# Patient Record
Sex: FEMALE | Race: WHITE | NOT HISPANIC OR LATINO | Employment: UNEMPLOYED | URBAN - NONMETROPOLITAN AREA
[De-identification: names, ages, dates, MRNs, and addresses within clinical notes are randomized per-mention and may not be internally consistent; named-entity substitution may affect disease eponyms.]

---

## 2024-04-16 ENCOUNTER — HOSPITAL ENCOUNTER (OUTPATIENT)
Dept: RADIOLOGY | Facility: HOSPITAL | Age: 39
Discharge: HOME | End: 2024-04-16
Attending: NURSE PRACTITIONER
Payer: MEDICAID

## 2024-04-16 DIAGNOSIS — R10.32 ABDOMINAL PAIN, LEFT LOWER QUADRANT: ICD-10-CM

## 2024-04-16 PROCEDURE — 74018 RADEX ABDOMEN 1 VIEW: CPT | Mod: TC

## 2024-04-29 ENCOUNTER — HOSPITAL ENCOUNTER (EMERGENCY)
Facility: HOSPITAL | Age: 39
Discharge: ANOTHER HOSPITAL | End: 2024-04-29
Attending: FAMILY MEDICINE
Payer: MEDICAID

## 2024-04-29 ENCOUNTER — HOSPITAL ENCOUNTER (INPATIENT)
Facility: HOSPITAL | Age: 39
LOS: 2 days | Discharge: HOME | DRG: 661 | End: 2024-05-01
Attending: INTERNAL MEDICINE | Admitting: INTERNAL MEDICINE
Payer: MEDICAID

## 2024-04-29 VITALS
TEMPERATURE: 98 F | HEIGHT: 65 IN | WEIGHT: 145 LBS | OXYGEN SATURATION: 99 % | HEART RATE: 79 BPM | RESPIRATION RATE: 17 BRPM | DIASTOLIC BLOOD PRESSURE: 99 MMHG | SYSTOLIC BLOOD PRESSURE: 171 MMHG | BODY MASS INDEX: 24.16 KG/M2

## 2024-04-29 DIAGNOSIS — R07.9 CHEST PAIN: ICD-10-CM

## 2024-04-29 DIAGNOSIS — N20.1 LEFT URETERAL STONE: ICD-10-CM

## 2024-04-29 DIAGNOSIS — N13.30 HYDRONEPHROSIS: ICD-10-CM

## 2024-04-29 DIAGNOSIS — N20.0 KIDNEY STONE: ICD-10-CM

## 2024-04-29 DIAGNOSIS — N13.30 HYDRONEPHROSIS, UNSPECIFIED HYDRONEPHROSIS TYPE: Primary | ICD-10-CM

## 2024-04-29 LAB
ALBUMIN SERPL-MCNC: 4.1 G/DL (ref 3.4–5)
ALBUMIN/GLOB SERPL: 1.3 RATIO
ALP SERPL-CCNC: 76 UNIT/L (ref 50–144)
ALT SERPL-CCNC: 23 UNIT/L (ref 1–45)
ANION GAP SERPL CALC-SCNC: 10 MEQ/L (ref 2–13)
APPEARANCE UR: ABNORMAL
AST SERPL-CCNC: 31 UNIT/L (ref 14–36)
B-HCG SERPL QL: NEGATIVE
BACTERIA #/AREA URNS AUTO: ABNORMAL /HPF
BASOPHILS # BLD AUTO: 0.06 X10(3)/MCL (ref 0.01–0.08)
BASOPHILS NFR BLD AUTO: 0.8 % (ref 0.1–1.2)
BILIRUB SERPL-MCNC: 0.3 MG/DL (ref 0–1)
BILIRUB UR QL STRIP.AUTO: NEGATIVE
BUN SERPL-MCNC: 6 MG/DL (ref 7–20)
CALCIUM SERPL-MCNC: 8.5 MG/DL (ref 8.4–10.2)
CHLORIDE SERPL-SCNC: 107 MMOL/L (ref 98–110)
CO2 SERPL-SCNC: 24 MMOL/L (ref 21–32)
COLOR UR AUTO: ABNORMAL
CREAT SERPL-MCNC: 0.51 MG/DL (ref 0.66–1.25)
CREAT/UREA NIT SERPL: 12 (ref 12–20)
EOSINOPHIL # BLD AUTO: 0.43 X10(3)/MCL (ref 0.04–0.36)
EOSINOPHIL NFR BLD AUTO: 5.4 % (ref 0.7–7)
ERYTHROCYTE [DISTWIDTH] IN BLOOD BY AUTOMATED COUNT: 14.2 % (ref 11–14.5)
GFR SERPLBLD CREATININE-BSD FMLA CKD-EPI: >90 MLS/MIN/1.73/M2
GLOBULIN SER-MCNC: 3.1 GM/DL (ref 2–3.9)
GLUCOSE SERPL-MCNC: 107 MG/DL (ref 70–115)
GLUCOSE UR QL STRIP.AUTO: NEGATIVE
HCT VFR BLD AUTO: 33.9 % (ref 36–48)
HGB BLD-MCNC: 11 G/DL (ref 11.8–16)
IMM GRANULOCYTES # BLD AUTO: 0.01 X10(3)/MCL (ref 0–0.03)
IMM GRANULOCYTES NFR BLD AUTO: 0.1 % (ref 0–0.5)
KETONES UR QL STRIP.AUTO: NEGATIVE
LEUKOCYTE ESTERASE UR QL STRIP.AUTO: NEGATIVE
LYMPHOCYTES # BLD AUTO: 3.87 X10(3)/MCL (ref 1.16–3.74)
LYMPHOCYTES NFR BLD AUTO: 49 % (ref 20–55)
MAGNESIUM SERPL-MCNC: 1.9 MG/DL (ref 1.8–2.4)
MCH RBC QN AUTO: 29.1 PG (ref 27–34)
MCHC RBC AUTO-ENTMCNC: 32.4 G/DL (ref 31–37)
MCV RBC AUTO: 89.7 FL (ref 79–99)
MONOCYTES # BLD AUTO: 0.48 X10(3)/MCL (ref 0.24–0.36)
MONOCYTES NFR BLD AUTO: 6.1 % (ref 4.7–12.5)
NEUTROPHILS # BLD AUTO: 3.04 X10(3)/MCL (ref 1.56–6.13)
NEUTROPHILS NFR BLD AUTO: 38.6 % (ref 37–73)
NITRITE UR QL STRIP.AUTO: NEGATIVE
NRBC BLD AUTO-RTO: 0 %
PH UR STRIP.AUTO: 7 [PH]
PLATELET # BLD AUTO: 335 X10(3)/MCL (ref 140–371)
PMV BLD AUTO: 10.7 FL (ref 9.4–12.4)
POTASSIUM SERPL-SCNC: 3.3 MMOL/L (ref 3.5–5.1)
PROT SERPL-MCNC: 7.2 GM/DL (ref 6.3–8.2)
PROT UR QL STRIP.AUTO: NEGATIVE
RBC # BLD AUTO: 3.78 X10(6)/MCL (ref 4–5.1)
RBC #/AREA URNS AUTO: >100 /HPF
RBC UR QL AUTO: ABNORMAL
SODIUM SERPL-SCNC: 141 MMOL/L (ref 135–145)
SP GR UR STRIP.AUTO: 1.01 (ref 1–1.03)
SQUAMOUS #/AREA URNS AUTO: ABNORMAL /HPF
UROBILINOGEN UR STRIP-ACNC: 0.2
WBC # SPEC AUTO: 7.89 X10(3)/MCL (ref 4–11.5)

## 2024-04-29 PROCEDURE — 96374 THER/PROPH/DIAG INJ IV PUSH: CPT

## 2024-04-29 PROCEDURE — 11000001 HC ACUTE MED/SURG PRIVATE ROOM

## 2024-04-29 PROCEDURE — 81025 URINE PREGNANCY TEST: CPT | Performed by: FAMILY MEDICINE

## 2024-04-29 PROCEDURE — 96372 THER/PROPH/DIAG INJ SC/IM: CPT | Mod: 59 | Performed by: FAMILY MEDICINE

## 2024-04-29 PROCEDURE — 96361 HYDRATE IV INFUSION ADD-ON: CPT

## 2024-04-29 PROCEDURE — 96375 TX/PRO/DX INJ NEW DRUG ADDON: CPT

## 2024-04-29 PROCEDURE — 83735 ASSAY OF MAGNESIUM: CPT | Performed by: FAMILY MEDICINE

## 2024-04-29 PROCEDURE — 25000003 PHARM REV CODE 250: Performed by: FAMILY MEDICINE

## 2024-04-29 PROCEDURE — 63600175 PHARM REV CODE 636 W HCPCS: Performed by: FAMILY MEDICINE

## 2024-04-29 PROCEDURE — 81003 URINALYSIS AUTO W/O SCOPE: CPT | Performed by: FAMILY MEDICINE

## 2024-04-29 PROCEDURE — 99285 EMERGENCY DEPT VISIT HI MDM: CPT | Mod: 25

## 2024-04-29 PROCEDURE — 80053 COMPREHEN METABOLIC PANEL: CPT | Performed by: FAMILY MEDICINE

## 2024-04-29 PROCEDURE — 85025 COMPLETE CBC W/AUTO DIFF WBC: CPT | Performed by: FAMILY MEDICINE

## 2024-04-29 RX ORDER — MORPHINE SULFATE 4 MG/ML
4 INJECTION, SOLUTION INTRAMUSCULAR; INTRAVENOUS
Status: COMPLETED | OUTPATIENT
Start: 2024-04-29 | End: 2024-04-29

## 2024-04-29 RX ORDER — TAMSULOSIN HYDROCHLORIDE 0.4 MG/1
0.4 CAPSULE ORAL
Status: COMPLETED | OUTPATIENT
Start: 2024-04-29 | End: 2024-04-29

## 2024-04-29 RX ORDER — HYDROMORPHONE HYDROCHLORIDE 1 MG/ML
1 INJECTION, SOLUTION INTRAMUSCULAR; INTRAVENOUS; SUBCUTANEOUS
Status: COMPLETED | OUTPATIENT
Start: 2024-04-29 | End: 2024-04-29

## 2024-04-29 RX ORDER — CLONIDINE HYDROCHLORIDE 0.1 MG/1
0.1 TABLET ORAL
Status: COMPLETED | OUTPATIENT
Start: 2024-04-29 | End: 2024-04-29

## 2024-04-29 RX ORDER — POTASSIUM CHLORIDE 20 MEQ/1
40 TABLET, EXTENDED RELEASE ORAL
Status: COMPLETED | OUTPATIENT
Start: 2024-04-29 | End: 2024-04-29

## 2024-04-29 RX ORDER — SUMATRIPTAN 6 MG/.5ML
6 INJECTION, SOLUTION SUBCUTANEOUS ONCE
Status: COMPLETED | OUTPATIENT
Start: 2024-04-29 | End: 2024-04-29

## 2024-04-29 RX ORDER — HYDRALAZINE HYDROCHLORIDE 20 MG/ML
10 INJECTION INTRAMUSCULAR; INTRAVENOUS
Status: COMPLETED | OUTPATIENT
Start: 2024-04-29 | End: 2024-04-29

## 2024-04-29 RX ADMIN — TAMSULOSIN HYDROCHLORIDE 0.4 MG: 0.4 CAPSULE ORAL at 06:04

## 2024-04-29 RX ADMIN — MORPHINE SULFATE 4 MG: 4 INJECTION, SOLUTION INTRAMUSCULAR; INTRAVENOUS at 05:04

## 2024-04-29 RX ADMIN — SUMATRIPTAN 6 MG: 6 INJECTION, SOLUTION SUBCUTANEOUS at 06:04

## 2024-04-29 RX ADMIN — CLONIDINE HYDROCHLORIDE 0.1 MG: 0.1 TABLET ORAL at 06:04

## 2024-04-29 RX ADMIN — POTASSIUM CHLORIDE 40 MEQ: 1500 TABLET, EXTENDED RELEASE ORAL at 06:04

## 2024-04-29 RX ADMIN — SODIUM CHLORIDE 1000 ML: 9 INJECTION, SOLUTION INTRAVENOUS at 05:04

## 2024-04-29 RX ADMIN — HYDRALAZINE HYDROCHLORIDE 10 MG: 20 INJECTION INTRAMUSCULAR; INTRAVENOUS at 07:04

## 2024-04-29 RX ADMIN — HYDROMORPHONE HYDROCHLORIDE 1 MG: 1 INJECTION, SOLUTION INTRAMUSCULAR; INTRAVENOUS; SUBCUTANEOUS at 07:04

## 2024-04-29 NOTE — ED PROVIDER NOTES
Encounter Date: 4/29/2024       History     Chief Complaint   Patient presents with    Back Pain    Headache    Nausea     Pt reports she started having back left side pain and into her flank. Reports she has had kidney stones and kidney infections before and this feels the same. Also reports she has been having blood in her urine and has a migraine.      Patient presents for evaluation of headache and left-sided flank pain.  Patient notes having symptoms since this morning.  Pain is aching moderate nonradiating pain that is present throughout the day.  Patient notes pain is worse with movement.  Patient has a history of migraines in the past and headache pain is similar to previous episodes.  Pain is throbbing and nonradiating.  Patient denies fevers chills nausea vomiting.  Patient does note having some hematuria with her flank pain.  Patient has a history of previous kidney stones with similar episodes.  Flank pain is sharp constant since onset.  Patient denies having aggravating or relieving    The history is provided by the patient.     Review of patient's allergies indicates:   Allergen Reactions    Sulfamethoxazole-trimethoprim Other (See Comments)     Past Medical History:   Diagnosis Date    Anxiety     Depression     Hematuria     HTN (hypertension)     Kidney stone     Migraine headache     UTI (urinary tract infection)      Past Surgical History:   Procedure Laterality Date    LITHOTRIPSY  2021    SINUS SURGERY      SINUS SURGERY      TONSILLECTOMY, ADENOIDECTOMY      TYMPANOSTOMY TUBE PLACEMENT       Family History   Problem Relation Name Age of Onset    Heart disease Mother      Hypertension Mother       Social History     Tobacco Use    Smoking status: Every Day   Substance Use Topics    Alcohol use: Not Currently     Review of Systems   Constitutional: Negative.    HENT: Negative.     Eyes: Negative.    Respiratory: Negative.     Cardiovascular: Negative.    Gastrointestinal: Negative.    Endocrine:  Negative.    Genitourinary:  Positive for flank pain, frequency, hematuria and urgency.   Skin: Negative.    Allergic/Immunologic: Negative.    Neurological:  Positive for headaches.   Hematological: Negative.    Psychiatric/Behavioral: Negative.     All other systems reviewed and are negative.      Physical Exam     Initial Vitals [04/29/24 1650]   BP Pulse Resp Temp SpO2   (!) 158/104 78 20 98.1 °F (36.7 °C) 99 %      MAP       --         Physical Exam    Vitals reviewed.  Constitutional: She appears well-developed and well-nourished. She is not diaphoretic. No distress.   HENT:   Head: Normocephalic and atraumatic.   Mouth/Throat: No oropharyngeal exudate.   Eyes: Conjunctivae and EOM are normal. Pupils are equal, round, and reactive to light. Right eye exhibits no discharge. Left eye exhibits no discharge.   Neck: Neck supple. No thyromegaly present. No tracheal deviation present.   Normal range of motion.  Cardiovascular:  Normal rate and regular rhythm.     Exam reveals no friction rub.       No murmur heard.  Pulmonary/Chest: Breath sounds normal. No stridor. No respiratory distress. She has no wheezes. She has no rales.   Abdominal: Abdomen is soft. Bowel sounds are normal. She exhibits no distension. There is no abdominal tenderness. There is no rebound.   Musculoskeletal:         General: No tenderness or edema. Normal range of motion.      Cervical back: Normal range of motion and neck supple.     Neurological: She is alert and oriented to person, place, and time. She has normal reflexes. She displays normal reflexes. No cranial nerve deficit.   Skin: Skin is warm.   Psychiatric: She has a normal mood and affect.         ED Course   Procedures  Labs Reviewed   COMPREHENSIVE METABOLIC PANEL - Abnormal; Notable for the following components:       Result Value    Potassium Level 3.3 (*)     Blood Urea Nitrogen 6.0 (*)     Creatinine 0.51 (*)     All other components within normal limits   URINALYSIS -  Abnormal; Notable for the following components:    Color, UA Brown (*)     Appearance, UA SL CLOUDY (*)     Blood, UA Large (*)     All other components within normal limits    Narrative:      URINE STABILITY IS 2 HOURS AT ROOM TEMP OR    SIX HOURS REFRIGERATED. PERFORMING TESTING ON    SPECIMENS GREATER THAN THIS AGE MAY AFFECT THE    FOLLOWING TESTS:    PH          SPECIFIC GRAVITY           BLOOD    CLARITY     BILIRUBIN               UROBILINOGEN   CBC WITH DIFFERENTIAL - Abnormal; Notable for the following components:    RBC 3.78 (*)     Hgb 11.0 (*)     Hct 33.9 (*)     Lymph # 3.87 (*)     Mono # 0.48 (*)     Eos # 0.43 (*)     All other components within normal limits   URINALYSIS, MICROSCOPIC - Abnormal; Notable for the following components:    RBC, UA >100 (*)     All other components within normal limits   MAGNESIUM - Normal   HCG QUALITATIVE URINE - Normal   CBC W/ AUTO DIFFERENTIAL    Narrative:     The following orders were created for panel order CBC auto differential.  Procedure                               Abnormality         Status                     ---------                               -----------         ------                     CBC with Differential[[id number]]       Abnormal            Final result                 Please view results for these tests on the individual orders.          Imaging Results              CT Abdomen Pelvis  Without Contrast (Final result)  Result time 04/29/24 18:32:31      Final result by Johann Nowak MD (04/29/24 18:32:31)                   Impression:        1. A 6 mm radiopaque obstructing calculus involving the left ureter at the level of the iliac crest with mild upstream left-sided hydronephrosis and hydroureter.    2.  Both kidneys demonstrate multiple small nonobstructing calculi within the calices measuring less than 5 mm in size.    3.  Formed stool is present in the region of the cecum and the ascending colon which can be seen with mild  constipation.    n/a    CATEGORY: n/a    The following dose reduction techniques are used for all CT at [facility]:    1.   Automated exposure control.    2.   Adjustment of the mA and/or kV according to patient size.    3.   Use of iterative reconstruction technique.      Electronically signed by: Johann Nowak  Date:    04/29/2024  Time:    18:32               Narrative:    EXAMINATION:  CT ABDOMEN PELVIS WITHOUT CONTRAST    CLINICAL HISTORY:  Flank pain;    TECHNIQUE:  Low dose axial images, sagittal and coronal reformations were obtained from the lung bases to the pubic symphysis.  Oral contrast was not administered.    COMPARISON:  04/30/2021    FINDINGS:  Liver:  No clinically significant abnormalities noted.    Gallbladder/Biliary System:  No clinically significant abnormalities noted.    Spleen:  No clinically significant abnormalities noted.    Adrenal glands:  No clinically significant abnormalities noted.    Pancreas:  No clinically significant abnormalities noted.    Kidneys/Urinary Tract: There is mild low-grade left-sided hydronephrosis and hydroureter associated with a 6 mm obstructing calculus at the level of the iliac crest.  Phlebolith like calcifications are present in the pelvic region on both sides.  The right kidney demonstrates multiple (nearly 10) radiopaque nonobstructing calculi, the largest measuring 5 mm.  The left kidney demonstrates multiple (approximately 5) nonobstructing calculi, the largest measuring approximately 5 mm.    Urinary bladder:  No clinically significant abnormalities noted.    Prostate gland/uterus and ovaries:  No clinically significant abnormalities noted.    GI tract:  Formed stool is present in the region of the cecum and the ascending colon which can be seen with mild constipation.    Vascular structures:  No clinically significant abnormalities noted.    Musculoskeletal structures:  No clinically significant abnormalities  noted.    Miscellaneous:  No clinically significant abnormalities noted.                                       Medications   sodium chloride 0.9% bolus 1,000 mL 1,000 mL (0 mLs Intravenous Stopped 4/29/24 1824)   morphine injection 4 mg (4 mg Intravenous Given 4/29/24 1724)   potassium chloride SA CR tablet 40 mEq (40 mEq Oral Given 4/29/24 1806)   cloNIDine tablet 0.1 mg (0.1 mg Oral Given 4/29/24 1816)   tamsulosin 24 hr capsule 0.4 mg (0.4 mg Oral Given 4/29/24 1855)   SUMAtriptan succinate injection 6 mg (6 mg Subcutaneous Given 4/29/24 1855)   hydrALAZINE injection 10 mg (10 mg Intravenous Given 4/29/24 1906)     Medical Decision Making  Amount and/or Complexity of Data Reviewed  Labs: ordered.  Radiology: ordered.    Risk  Prescription drug management.                                  Patient accepted by Dr. Mims at [facility].    Clinical Impression:  Final diagnoses:  [N13.30] Hydronephrosis, unspecified hydronephrosis type (Primary)  [N20.0] Kidney stone          ED Disposition Condition    Transfer to Another Facility Carlos Bello MD  04/29/24 1931

## 2024-04-30 ENCOUNTER — ANESTHESIA (OUTPATIENT)
Dept: SURGERY | Facility: HOSPITAL | Age: 39
DRG: 661 | End: 2024-04-30
Payer: MEDICAID

## 2024-04-30 ENCOUNTER — ANESTHESIA EVENT (OUTPATIENT)
Dept: SURGERY | Facility: HOSPITAL | Age: 39
DRG: 661 | End: 2024-04-30
Payer: MEDICAID

## 2024-04-30 LAB
ALBUMIN SERPL-MCNC: 3.6 G/DL (ref 3.5–5)
ALBUMIN/GLOB SERPL: 1.4 RATIO (ref 1.1–2)
ALP SERPL-CCNC: 66 UNIT/L (ref 40–150)
ALT SERPL-CCNC: 10 UNIT/L (ref 0–55)
AMPHET UR QL SCN: NEGATIVE
AST SERPL-CCNC: 15 UNIT/L (ref 5–34)
BARBITURATE SCN PRESENT UR: NEGATIVE
BASOPHILS # BLD AUTO: 0.05 X10(3)/MCL
BASOPHILS NFR BLD AUTO: 0.6 %
BENZODIAZ UR QL SCN: POSITIVE
BILIRUB SERPL-MCNC: 0.3 MG/DL
BUN SERPL-MCNC: 5.7 MG/DL (ref 7–18.7)
CALCIUM SERPL-MCNC: 8.7 MG/DL (ref 8.4–10.2)
CANNABINOIDS UR QL SCN: NEGATIVE
CHLORIDE SERPL-SCNC: 109 MMOL/L (ref 98–107)
CO2 SERPL-SCNC: 22 MMOL/L (ref 22–29)
COCAINE UR QL SCN: NEGATIVE
CREAT SERPL-MCNC: 0.66 MG/DL (ref 0.55–1.02)
EOSINOPHIL # BLD AUTO: 0.24 X10(3)/MCL (ref 0–0.9)
EOSINOPHIL NFR BLD AUTO: 3.1 %
ERYTHROCYTE [DISTWIDTH] IN BLOOD BY AUTOMATED COUNT: 14.6 % (ref 11.5–17)
FENTANYL UR QL SCN: POSITIVE
GFR SERPLBLD CREATININE-BSD FMLA CKD-EPI: >60 MLS/MIN/1.73/M2
GLOBULIN SER-MCNC: 2.6 GM/DL (ref 2.4–3.5)
GLUCOSE SERPL-MCNC: 112 MG/DL (ref 74–100)
HCT VFR BLD AUTO: 32.4 % (ref 37–47)
HGB BLD-MCNC: 10.4 G/DL (ref 12–16)
IMM GRANULOCYTES # BLD AUTO: 0.02 X10(3)/MCL (ref 0–0.04)
IMM GRANULOCYTES NFR BLD AUTO: 0.3 %
LYMPHOCYTES # BLD AUTO: 2.12 X10(3)/MCL (ref 0.6–4.6)
LYMPHOCYTES NFR BLD AUTO: 27.1 %
MAGNESIUM SERPL-MCNC: 1.9 MG/DL (ref 1.6–2.6)
MCH RBC QN AUTO: 29.3 PG (ref 27–31)
MCHC RBC AUTO-ENTMCNC: 32.1 G/DL (ref 33–36)
MCV RBC AUTO: 91.3 FL (ref 80–94)
MDMA UR QL SCN: NEGATIVE
MONOCYTES # BLD AUTO: 0.44 X10(3)/MCL (ref 0.1–1.3)
MONOCYTES NFR BLD AUTO: 5.6 %
NEUTROPHILS # BLD AUTO: 4.94 X10(3)/MCL (ref 2.1–9.2)
NEUTROPHILS NFR BLD AUTO: 63.3 %
NRBC BLD AUTO-RTO: 0 %
OPIATES UR QL SCN: POSITIVE
PCP UR QL: NEGATIVE
PH UR: 6.5 [PH] (ref 3–11)
PLATELET # BLD AUTO: 280 X10(3)/MCL (ref 130–400)
PMV BLD AUTO: 11 FL (ref 7.4–10.4)
POTASSIUM SERPL-SCNC: 3.7 MMOL/L (ref 3.5–5.1)
PROT SERPL-MCNC: 6.2 GM/DL (ref 6.4–8.3)
RBC # BLD AUTO: 3.55 X10(6)/MCL (ref 4.2–5.4)
SODIUM SERPL-SCNC: 139 MMOL/L (ref 136–145)
SPECIFIC GRAVITY, URINE AUTO (.000) (OHS): 1.02 (ref 1–1.03)
WBC # SPEC AUTO: 7.81 X10(3)/MCL (ref 4.5–11.5)

## 2024-04-30 PROCEDURE — 37000009 HC ANESTHESIA EA ADD 15 MINS: Performed by: SPECIALIST

## 2024-04-30 PROCEDURE — BT141ZZ FLUOROSCOPY OF KIDNEYS, URETERS AND BLADDER USING LOW OSMOLAR CONTRAST: ICD-10-PCS | Performed by: SPECIALIST

## 2024-04-30 PROCEDURE — 11000001 HC ACUTE MED/SURG PRIVATE ROOM

## 2024-04-30 PROCEDURE — D9220A PRA ANESTHESIA: Mod: ANES,,, | Performed by: ANESTHESIOLOGY

## 2024-04-30 PROCEDURE — 63600175 PHARM REV CODE 636 W HCPCS

## 2024-04-30 PROCEDURE — C1894 INTRO/SHEATH, NON-LASER: HCPCS | Performed by: SPECIALIST

## 2024-04-30 PROCEDURE — 36415 COLL VENOUS BLD VENIPUNCTURE: CPT | Performed by: NURSE PRACTITIONER

## 2024-04-30 PROCEDURE — 25000003 PHARM REV CODE 250

## 2024-04-30 PROCEDURE — D9220A PRA ANESTHESIA: Mod: CRNA,,,

## 2024-04-30 PROCEDURE — 80053 COMPREHEN METABOLIC PANEL: CPT | Performed by: NURSE PRACTITIONER

## 2024-04-30 PROCEDURE — C1769 GUIDE WIRE: HCPCS | Performed by: SPECIALIST

## 2024-04-30 PROCEDURE — 85025 COMPLETE CBC W/AUTO DIFF WBC: CPT | Performed by: NURSE PRACTITIONER

## 2024-04-30 PROCEDURE — 36000706: Performed by: SPECIALIST

## 2024-04-30 PROCEDURE — 37000008 HC ANESTHESIA 1ST 15 MINUTES: Performed by: SPECIALIST

## 2024-04-30 PROCEDURE — 0T778DZ DILATION OF LEFT URETER WITH INTRALUMINAL DEVICE, VIA NATURAL OR ARTIFICIAL OPENING ENDOSCOPIC: ICD-10-PCS | Performed by: SPECIALIST

## 2024-04-30 PROCEDURE — 83735 ASSAY OF MAGNESIUM: CPT | Performed by: NURSE PRACTITIONER

## 2024-04-30 PROCEDURE — 71000033 HC RECOVERY, INTIAL HOUR: Performed by: SPECIALIST

## 2024-04-30 PROCEDURE — 88300 SURGICAL PATH GROSS: CPT | Performed by: SPECIALIST

## 2024-04-30 PROCEDURE — C1773 RET DEV, INSERTABLE: HCPCS | Performed by: SPECIALIST

## 2024-04-30 PROCEDURE — 36000707: Performed by: SPECIALIST

## 2024-04-30 PROCEDURE — 82365 CALCULUS SPECTROSCOPY: CPT

## 2024-04-30 PROCEDURE — 63600175 PHARM REV CODE 636 W HCPCS: Performed by: INTERNAL MEDICINE

## 2024-04-30 PROCEDURE — 80307 DRUG TEST PRSMV CHEM ANLYZR: CPT | Performed by: STUDENT IN AN ORGANIZED HEALTH CARE EDUCATION/TRAINING PROGRAM

## 2024-04-30 PROCEDURE — 63600175 PHARM REV CODE 636 W HCPCS: Performed by: ANESTHESIOLOGY

## 2024-04-30 PROCEDURE — C2617 STENT, NON-COR, TEM W/O DEL: HCPCS | Performed by: SPECIALIST

## 2024-04-30 PROCEDURE — 63600175 PHARM REV CODE 636 W HCPCS: Performed by: NURSE PRACTITIONER

## 2024-04-30 PROCEDURE — 25000003 PHARM REV CODE 250: Performed by: INTERNAL MEDICINE

## 2024-04-30 PROCEDURE — C1758 CATHETER, URETERAL: HCPCS | Performed by: SPECIALIST

## 2024-04-30 PROCEDURE — 0TC78ZZ EXTIRPATION OF MATTER FROM LEFT URETER, VIA NATURAL OR ARTIFICIAL OPENING ENDOSCOPIC: ICD-10-PCS | Performed by: SPECIALIST

## 2024-04-30 DEVICE — STENT SET URETERAL 6X24CM: Type: IMPLANTABLE DEVICE | Site: URETER | Status: FUNCTIONAL

## 2024-04-30 RX ORDER — LIDOCAINE HYDROCHLORIDE 20 MG/ML
INJECTION INTRAVENOUS
Status: DISCONTINUED | OUTPATIENT
Start: 2024-04-30 | End: 2024-04-30

## 2024-04-30 RX ORDER — FLUOXETINE HYDROCHLORIDE 20 MG/1
40 CAPSULE ORAL NIGHTLY
Status: DISCONTINUED | OUTPATIENT
Start: 2024-04-30 | End: 2024-05-01 | Stop reason: HOSPADM

## 2024-04-30 RX ORDER — IBUPROFEN 200 MG
16 TABLET ORAL
Status: DISCONTINUED | OUTPATIENT
Start: 2024-04-30 | End: 2024-05-01 | Stop reason: HOSPADM

## 2024-04-30 RX ORDER — ROCURONIUM BROMIDE 10 MG/ML
INJECTION, SOLUTION INTRAVENOUS
Status: DISCONTINUED | OUTPATIENT
Start: 2024-04-30 | End: 2024-04-30

## 2024-04-30 RX ORDER — RIZATRIPTAN BENZOATE 10 MG/1
10 TABLET, ORALLY DISINTEGRATING ORAL
COMMUNITY

## 2024-04-30 RX ORDER — GLUCAGON 1 MG
1 KIT INJECTION
Status: DISCONTINUED | OUTPATIENT
Start: 2024-04-30 | End: 2024-05-01 | Stop reason: HOSPADM

## 2024-04-30 RX ORDER — HYDROMORPHONE HYDROCHLORIDE 2 MG/ML
0.2 INJECTION, SOLUTION INTRAMUSCULAR; INTRAVENOUS; SUBCUTANEOUS EVERY 5 MIN PRN
Status: DISCONTINUED | OUTPATIENT
Start: 2024-04-30 | End: 2024-04-30 | Stop reason: HOSPADM

## 2024-04-30 RX ORDER — PROPOFOL 10 MG/ML
VIAL (ML) INTRAVENOUS
Status: DISCONTINUED | OUTPATIENT
Start: 2024-04-30 | End: 2024-04-30

## 2024-04-30 RX ORDER — ALUMINUM HYDROXIDE, MAGNESIUM HYDROXIDE, AND SIMETHICONE 1200; 120; 1200 MG/30ML; MG/30ML; MG/30ML
30 SUSPENSION ORAL 4 TIMES DAILY PRN
Status: DISCONTINUED | OUTPATIENT
Start: 2024-04-30 | End: 2024-05-01 | Stop reason: HOSPADM

## 2024-04-30 RX ORDER — MEPERIDINE HYDROCHLORIDE 25 MG/ML
12.5 INJECTION INTRAMUSCULAR; INTRAVENOUS; SUBCUTANEOUS EVERY 10 MIN PRN
Status: DISCONTINUED | OUTPATIENT
Start: 2024-04-30 | End: 2024-04-30 | Stop reason: HOSPADM

## 2024-04-30 RX ORDER — SUCCINYLCHOLINE CHLORIDE 20 MG/ML
INJECTION INTRAMUSCULAR; INTRAVENOUS
Status: DISCONTINUED | OUTPATIENT
Start: 2024-04-30 | End: 2024-04-30

## 2024-04-30 RX ORDER — ONDANSETRON HYDROCHLORIDE 2 MG/ML
INJECTION, SOLUTION INTRAVENOUS
Status: DISCONTINUED | OUTPATIENT
Start: 2024-04-30 | End: 2024-04-30

## 2024-04-30 RX ORDER — ACETAMINOPHEN 500 MG
1000 TABLET ORAL EVERY 6 HOURS PRN
Status: DISCONTINUED | OUTPATIENT
Start: 2024-04-30 | End: 2024-05-01 | Stop reason: HOSPADM

## 2024-04-30 RX ORDER — TALC
6 POWDER (GRAM) TOPICAL NIGHTLY PRN
Status: DISCONTINUED | OUTPATIENT
Start: 2024-04-30 | End: 2024-05-01 | Stop reason: HOSPADM

## 2024-04-30 RX ORDER — KETOROLAC TROMETHAMINE 30 MG/ML
30 INJECTION, SOLUTION INTRAMUSCULAR; INTRAVENOUS EVERY 6 HOURS PRN
Status: DISCONTINUED | OUTPATIENT
Start: 2024-04-30 | End: 2024-05-01 | Stop reason: HOSPADM

## 2024-04-30 RX ORDER — HYDRALAZINE HYDROCHLORIDE 20 MG/ML
10 INJECTION INTRAMUSCULAR; INTRAVENOUS EVERY 4 HOURS PRN
Status: DISCONTINUED | OUTPATIENT
Start: 2024-04-30 | End: 2024-05-01 | Stop reason: HOSPADM

## 2024-04-30 RX ORDER — LISINOPRIL 40 MG/1
40 TABLET ORAL DAILY
COMMUNITY

## 2024-04-30 RX ORDER — PROCHLORPERAZINE EDISYLATE 5 MG/ML
5 INJECTION INTRAMUSCULAR; INTRAVENOUS EVERY 30 MIN PRN
Status: DISCONTINUED | OUTPATIENT
Start: 2024-04-30 | End: 2024-04-30 | Stop reason: HOSPADM

## 2024-04-30 RX ORDER — CEFAZOLIN SODIUM 1 G/3ML
INJECTION, POWDER, FOR SOLUTION INTRAMUSCULAR; INTRAVENOUS
Status: DISCONTINUED | OUTPATIENT
Start: 2024-04-30 | End: 2024-04-30

## 2024-04-30 RX ORDER — ACETAMINOPHEN 325 MG/1
650 TABLET ORAL EVERY 4 HOURS PRN
Status: DISCONTINUED | OUTPATIENT
Start: 2024-04-30 | End: 2024-05-01 | Stop reason: HOSPADM

## 2024-04-30 RX ORDER — FENTANYL CITRATE 50 UG/ML
INJECTION, SOLUTION INTRAMUSCULAR; INTRAVENOUS
Status: DISCONTINUED | OUTPATIENT
Start: 2024-04-30 | End: 2024-04-30

## 2024-04-30 RX ORDER — SODIUM CHLORIDE, SODIUM LACTATE, POTASSIUM CHLORIDE, CALCIUM CHLORIDE 600; 310; 30; 20 MG/100ML; MG/100ML; MG/100ML; MG/100ML
INJECTION, SOLUTION INTRAVENOUS CONTINUOUS
Status: DISCONTINUED | OUTPATIENT
Start: 2024-04-30 | End: 2024-05-01 | Stop reason: HOSPADM

## 2024-04-30 RX ORDER — ONDANSETRON HYDROCHLORIDE 2 MG/ML
4 INJECTION, SOLUTION INTRAVENOUS DAILY PRN
Status: DISCONTINUED | OUTPATIENT
Start: 2024-04-30 | End: 2024-04-30 | Stop reason: HOSPADM

## 2024-04-30 RX ORDER — NALOXONE HCL 0.4 MG/ML
0.02 VIAL (ML) INJECTION
Status: DISCONTINUED | OUTPATIENT
Start: 2024-04-30 | End: 2024-05-01 | Stop reason: HOSPADM

## 2024-04-30 RX ORDER — IBUPROFEN 200 MG
24 TABLET ORAL
Status: DISCONTINUED | OUTPATIENT
Start: 2024-04-30 | End: 2024-05-01 | Stop reason: HOSPADM

## 2024-04-30 RX ORDER — SODIUM CHLORIDE 0.9 % (FLUSH) 0.9 %
10 SYRINGE (ML) INJECTION
Status: DISCONTINUED | OUTPATIENT
Start: 2024-04-30 | End: 2024-05-01 | Stop reason: HOSPADM

## 2024-04-30 RX ORDER — HYDROMORPHONE HYDROCHLORIDE 2 MG/ML
0.5 INJECTION, SOLUTION INTRAMUSCULAR; INTRAVENOUS; SUBCUTANEOUS EVERY 5 MIN PRN
Status: COMPLETED | OUTPATIENT
Start: 2024-04-30 | End: 2024-04-30

## 2024-04-30 RX ORDER — BISACODYL 10 MG/1
10 SUPPOSITORY RECTAL DAILY PRN
Status: DISCONTINUED | OUTPATIENT
Start: 2024-04-30 | End: 2024-05-01 | Stop reason: HOSPADM

## 2024-04-30 RX ORDER — DIPHENHYDRAMINE HYDROCHLORIDE 50 MG/ML
25 INJECTION INTRAMUSCULAR; INTRAVENOUS EVERY 6 HOURS PRN
Status: DISCONTINUED | OUTPATIENT
Start: 2024-04-30 | End: 2024-04-30 | Stop reason: HOSPADM

## 2024-04-30 RX ORDER — IPRATROPIUM BROMIDE AND ALBUTEROL SULFATE 2.5; .5 MG/3ML; MG/3ML
3 SOLUTION RESPIRATORY (INHALATION)
Status: DISCONTINUED | OUTPATIENT
Start: 2024-04-30 | End: 2024-04-30 | Stop reason: HOSPADM

## 2024-04-30 RX ORDER — DEXAMETHASONE SODIUM PHOSPHATE 4 MG/ML
INJECTION, SOLUTION INTRA-ARTICULAR; INTRALESIONAL; INTRAMUSCULAR; INTRAVENOUS; SOFT TISSUE
Status: DISCONTINUED | OUTPATIENT
Start: 2024-04-30 | End: 2024-04-30

## 2024-04-30 RX ORDER — PHENYLEPHRINE HYDROCHLORIDE 10 MG/ML
INJECTION INTRAVENOUS
Status: DISCONTINUED | OUTPATIENT
Start: 2024-04-30 | End: 2024-04-30

## 2024-04-30 RX ORDER — LISINOPRIL 20 MG/1
40 TABLET ORAL DAILY
Status: DISCONTINUED | OUTPATIENT
Start: 2024-04-30 | End: 2024-05-01

## 2024-04-30 RX ORDER — AMOXICILLIN 250 MG
1 CAPSULE ORAL 2 TIMES DAILY PRN
Status: DISCONTINUED | OUTPATIENT
Start: 2024-04-30 | End: 2024-05-01 | Stop reason: HOSPADM

## 2024-04-30 RX ORDER — PROCHLORPERAZINE EDISYLATE 5 MG/ML
5 INJECTION INTRAMUSCULAR; INTRAVENOUS EVERY 6 HOURS PRN
Status: DISCONTINUED | OUTPATIENT
Start: 2024-04-30 | End: 2024-05-01 | Stop reason: HOSPADM

## 2024-04-30 RX ORDER — MELOXICAM 15 MG/1
15 TABLET ORAL DAILY PRN
COMMUNITY

## 2024-04-30 RX ORDER — SUMATRIPTAN SUCCINATE 25 MG/1
50 TABLET ORAL
Status: DISCONTINUED | OUTPATIENT
Start: 2024-04-30 | End: 2024-05-01 | Stop reason: HOSPADM

## 2024-04-30 RX ORDER — ONDANSETRON HYDROCHLORIDE 2 MG/ML
4 INJECTION, SOLUTION INTRAVENOUS EVERY 6 HOURS PRN
Status: DISCONTINUED | OUTPATIENT
Start: 2024-04-30 | End: 2024-05-01 | Stop reason: HOSPADM

## 2024-04-30 RX ORDER — MIDAZOLAM HYDROCHLORIDE 1 MG/ML
INJECTION INTRAMUSCULAR; INTRAVENOUS
Status: DISCONTINUED | OUTPATIENT
Start: 2024-04-30 | End: 2024-04-30

## 2024-04-30 RX ADMIN — FENTANYL CITRATE 50 MCG: 50 INJECTION, SOLUTION INTRAMUSCULAR; INTRAVENOUS at 10:04

## 2024-04-30 RX ADMIN — HYDROMORPHONE HYDROCHLORIDE 0.5 MG: 2 INJECTION, SOLUTION INTRAMUSCULAR; INTRAVENOUS; SUBCUTANEOUS at 12:04

## 2024-04-30 RX ADMIN — KETOROLAC TROMETHAMINE 30 MG: 30 INJECTION, SOLUTION INTRAMUSCULAR; INTRAVENOUS at 08:04

## 2024-04-30 RX ADMIN — FENTANYL CITRATE 50 MCG: 50 INJECTION, SOLUTION INTRAMUSCULAR; INTRAVENOUS at 11:04

## 2024-04-30 RX ADMIN — PHENYLEPHRINE HYDROCHLORIDE 100 MCG: 10 INJECTION INTRAVENOUS at 11:04

## 2024-04-30 RX ADMIN — KETOROLAC TROMETHAMINE 30 MG: 30 INJECTION, SOLUTION INTRAMUSCULAR; INTRAVENOUS at 12:04

## 2024-04-30 RX ADMIN — PROPOFOL 200 MG: 10 INJECTION, EMULSION INTRAVENOUS at 10:04

## 2024-04-30 RX ADMIN — LIDOCAINE HYDROCHLORIDE 80 MG: 20 INJECTION INTRAVENOUS at 10:04

## 2024-04-30 RX ADMIN — ROCURONIUM BROMIDE 40 MG: 10 SOLUTION INTRAVENOUS at 11:04

## 2024-04-30 RX ADMIN — SODIUM CHLORIDE, POTASSIUM CHLORIDE, SODIUM LACTATE AND CALCIUM CHLORIDE: 600; 310; 30; 20 INJECTION, SOLUTION INTRAVENOUS at 07:04

## 2024-04-30 RX ADMIN — SUCCINYLCHOLINE CHLORIDE 120 MG: 20 INJECTION, SOLUTION INTRAMUSCULAR; INTRAVENOUS at 10:04

## 2024-04-30 RX ADMIN — SODIUM CHLORIDE, SODIUM GLUCONATE, SODIUM ACETATE, POTASSIUM CHLORIDE AND MAGNESIUM CHLORIDE: 526; 502; 368; 37; 30 INJECTION, SOLUTION INTRAVENOUS at 10:04

## 2024-04-30 RX ADMIN — ONDANSETRON 8 MG: 2 INJECTION INTRAMUSCULAR; INTRAVENOUS at 11:04

## 2024-04-30 RX ADMIN — DEXAMETHASONE SODIUM PHOSPHATE 8 MG: 4 INJECTION, SOLUTION INTRA-ARTICULAR; INTRALESIONAL; INTRAMUSCULAR; INTRAVENOUS; SOFT TISSUE at 10:04

## 2024-04-30 RX ADMIN — HYDROMORPHONE HYDROCHLORIDE 0.5 MG: 2 INJECTION, SOLUTION INTRAMUSCULAR; INTRAVENOUS; SUBCUTANEOUS at 11:04

## 2024-04-30 RX ADMIN — MIDAZOLAM HYDROCHLORIDE 2 MG: 1 INJECTION, SOLUTION INTRAMUSCULAR; INTRAVENOUS at 10:04

## 2024-04-30 RX ADMIN — SUMATRIPTAN SUCCINATE 50 MG: 25 TABLET ORAL at 05:04

## 2024-04-30 RX ADMIN — ROCURONIUM BROMIDE 10 MG: 10 SOLUTION INTRAVENOUS at 10:04

## 2024-04-30 RX ADMIN — HYDRALAZINE HYDROCHLORIDE 10 MG: 20 INJECTION INTRAMUSCULAR; INTRAVENOUS at 12:04

## 2024-04-30 RX ADMIN — SUGAMMADEX 200 MG: 100 INJECTION, SOLUTION INTRAVENOUS at 11:04

## 2024-04-30 RX ADMIN — MEPERIDINE HYDROCHLORIDE 12.5 MG: 25 INJECTION INTRAMUSCULAR; INTRAVENOUS; SUBCUTANEOUS at 12:04

## 2024-04-30 RX ADMIN — CEFAZOLIN 2 G: 330 INJECTION, POWDER, FOR SOLUTION INTRAMUSCULAR; INTRAVENOUS at 11:04

## 2024-04-30 RX ADMIN — PROCHLORPERAZINE EDISYLATE 5 MG: 5 INJECTION INTRAMUSCULAR; INTRAVENOUS at 12:04

## 2024-04-30 NOTE — NURSING
Nurses Note -- 4 Eyes      4/30/2024   2:56 AM      Skin assessed during: Admit      [x] No Altered Skin Integrity Present    [x]Prevention Measures Documented      [] Yes- Altered Skin Integrity Present or Discovered   [] LDA Added if Not in Epic (Describe Wound)   [] New Altered Skin Integrity was Present on Admit and Documented in LDA   [] Wound Image Taken    Wound Care Consulted? No    Attending Nurse:  Trudi Campbell RN/Staff Member:  Delilah Lopez LPN

## 2024-04-30 NOTE — PLAN OF CARE
PCP: [facility]       04/30/24 0786   Discharge Assessment   Assessment Type Discharge Planning Assessment   Confirmed/corrected address, phone number and insurance Yes   Confirmed Demographics Correct on Facesheet   Source of Information patient;family   Reason For Admission Pt reports she started having back left side pain and into her flank. Reports she has had kidney stones and kidney infections before and this feels the same. Also reports she has been having blood in her urine and has a migraine.   People in Home spouse;child(joni), dependent   Facility Arrived From: Home   Do you expect to return to your current living situation? Yes   Do you have help at home or someone to help you manage your care at home? Yes   Prior to hospitilization cognitive status: Alert/Oriented   Current cognitive status: Alert/Oriented   Home Accessibility stairs to enter home   Number of Stairs, Main Entrance three   Stair Railings, Main Entrance none   Home Layout Able to live on 1st floor   Equipment Currently Used at Home none   Readmission within 30 days? No   Patient currently being followed by outpatient case management? No   Do you currently have service(s) that help you manage your care at home? No   Do you take prescription medications? Yes   Do you have prescription coverage? Yes   Coverage [employer]   Do you have any problems affording any of your prescribed medications? No   Is the patient taking medications as prescribed? yes   How do you get to doctors appointments? car, drives self   Are you on dialysis? No   Do you take coumadin? No   Discharge Plan A Home with family   Discharge Plan B Home   DME Needed Upon Discharge  none   Discharge Plan discussed with: Patient;Spouse/sig other   Transition of Care Barriers None

## 2024-04-30 NOTE — ANESTHESIA PREPROCEDURE EVALUATION
04/30/2024  Edwin Ji is a 38 y.o., female.    Pre-op Diagnosis: Left ureteral stone [N20.1]    Procedure(s):  CYSTOURETEROSCOPY,WITH HOLMIUM LASER LITHOTRIPSY OF URETERAL CALCULUS     Review of patient's allergies indicates:   Allergen Reactions    Sulfamethoxazole-trimethoprim Other (See Comments)       Current Outpatient Medications   Medication Instructions    cloNIDine (CATAPRES) 0.1 MG tablet TAKE 1 TABLET ORALLY TWICE DAILY AS NEEDED FOR BLOOD PRESSURE  160/ 110. REPEAT IN 30 MIN AS NEEDED    FLUoxetine 40 MG capsule TAKE 1 CAPSULE BY MOUTH EVERY DAY AT NIGHT    lisinopriL (PRINIVIL,ZESTRIL) 40 mg, Oral, Daily    meloxicam (MOBIC) 15 mg, Oral, Daily PRN    rizatriptan (MAXALT-MLT) 10 mg, Oral, As needed (PRN), May repeat in 2 hours if needed    tamsulosin (FLOMAX) 0.4 mg, Oral, Daily           Past Medical History:   Diagnosis Date    Anxiety     Depression     Hematuria     HTN (hypertension)     Kidney stone     Migraine headache     UTI (urinary tract infection)        Past Surgical History:   Procedure Laterality Date    LITHOTRIPSY  2021    SINUS SURGERY      SINUS SURGERY      TONSILLECTOMY, ADENOIDECTOMY      TYMPANOSTOMY TUBE PLACEMENT        Pre-op Assessment    I have reviewed the Patient Summary Reports.    I have reviewed the NPO Status.   I have reviewed the Medications.     Review of Systems  Anesthesia Hx:  No problems with previous Anesthesia             Denies Family Hx of Anesthesia complications.    Denies Personal Hx of Anesthesia complications.                    Social:  Non-Smoker       Cardiovascular:  Exercise tolerance: good   Hypertension       Denies Angina.   Denies Orthopnea.  Denies PND.    Denies MOYA.    Functional Capacity good / => 4 METS                         Renal/:  Chronic Renal Disease                Musculoskeletal:  Musculoskeletal Normal                 Neurological:  Denies TIA.  Denies CVA.   Headaches (MIGRAINE HAs)                                 Endocrine:    Thyroid Disease (Off of meds)             Psych:   anxiety depression                Physical Exam  General: Well nourished, Alert and Oriented    Airway:  Mallampati: III   Mouth Opening: Normal  TM Distance: Normal  Tongue: Normal  Neck ROM: Normal ROM    Dental:  ALL UPPER TEETH SEVERELY DECAYED & BREAKED OFF AT ROOTS;  LOWER TEETH (INCISORS LOOK OKAY; HOWEVER PATIENT STATES NOT SURE)  Chest/Lungs:  Clear to auscultation    Heart:  Rate: Normal  Rhythm: Regular Rhythm  No pretibial edema  No carotid bruits      Anesthesia Plan  Type of Anesthesia, risks & benefits discussed:    Anesthesia Type: Gen ETT  Intra-op Monitoring Plan: Standard ASA Monitors  Post Op Pain Control Plan: multimodal analgesia  Induction:  IV and rapid sequence  Airway Plan: Direct, Post-Induction  Informed Consent: Informed consent signed with the Patient and all parties understand the risks and agree with anesthesia plan.  All questions answered. Patient consented to blood products? No  ASA Score: 3  Day of Surgery Review of History & Physical: H&P Update referred to the surgeon/provider.  Anesthesia Plan Notes: Modified RSI    Ready For Surgery From Anesthesia Perspective.     .

## 2024-04-30 NOTE — CONSULTS
Edwin Ji 1985  [id number]  4/30/2024    CONSULTING PHYSICIAN: HELIO Mccray    Reason for consult:  Left ureteral stone with hydronephrosis and hydroureter    HPI:  Ms. Saint Germain is a 38-year-old female with past medical history of hypertension, migraines, anxiety, depression, UTIs and kidney stones presented to [facility] emergency department with complaints of left-sided flank pain and hematuria.  Reports associated nausea and vomiting.  Denies fever, chills or dysuria.  She states she began to have pain last week, her PCP send her for an x-ray and a stone was not identified.  Workup not suggestive of any infection.  CT abdomen pelvis shows a 6 mm obstructing calculus in the distal left ureter causing mild upstream left-sided hydro uretero nephrosis with bilateral nonobstructing nephrolithiasis.  She has required surgical intervention for kidney stones in the past.  She does not follow a urologist here.      Past Medical History:   Diagnosis Date    Anxiety     Depression     Hematuria     HTN (hypertension)     Kidney stone     Migraine headache     UTI (urinary tract infection)      Past Surgical History:   Procedure Laterality Date    LITHOTRIPSY  2021    SINUS SURGERY      SINUS SURGERY      TONSILLECTOMY, ADENOIDECTOMY      TYMPANOSTOMY TUBE PLACEMENT       Family History   Problem Relation Name Age of Onset    Heart disease Mother      Hypertension Mother       Social History     Tobacco Use    Smoking status: Every Day   Substance Use Topics    Alcohol use: Not Currently     Current Facility-Administered Medications   Medication Dose Route Frequency Provider Last Rate Last Admin    acetaminophen tablet 1,000 mg  1,000 mg Oral Q6H PRN Patricia Miller FNP        acetaminophen tablet 650 mg  650 mg Oral Q4H PRN Patricia Miller FNP        aluminum-magnesium hydroxide-simethicone 200-200-20 mg/5 mL suspension 30 mL  30 mL Oral QID PRN Patricia Miller FNP        bisacodyL  suppository 10 mg  10 mg Rectal Daily PRN Patricia Miller, FNP        dextrose 10% bolus 125 mL 125 mL  12.5 g Intravenous PRN Patricia Miller, FNP        dextrose 10% bolus 250 mL 250 mL  25 g Intravenous PRN Patricia Miller, FNP        FLUoxetine capsule 40 mg  40 mg Oral QHS Patricia Miller, FNP        glucagon (human recombinant) injection 1 mg  1 mg Intramuscular PRN Patricia Miller, FNP        glucose chewable tablet 16 g  16 g Oral PRN Ptaricia Miller, FNP        glucose chewable tablet 24 g  24 g Oral PRN Patricia Miller, FNP        hydrALAZINE injection 10 mg  10 mg Intravenous Q4H PRN Patricia Miller, FNP   10 mg at 04/30/24 0042    ketorolac injection 30 mg  30 mg Intravenous Q6H PRN Patricia Miller, FNP   30 mg at 04/30/24 0042    lactated ringers infusion   Intravenous Continuous Alexis Mims  mL/hr at 04/30/24 0734 New Bag at 04/30/24 0734    lisinopriL tablet 40 mg  40 mg Oral Daily Patricia Miller, FNP        melatonin tablet 6 mg  6 mg Oral Nightly PRN Patricia Miller, FNP        naloxone 0.4 mg/mL injection 0.02 mg  0.02 mg Intravenous PRN Patricia Miller, FNP        ondansetron injection 4 mg  4 mg Intravenous Q6H PRN Patricia Miller, FNP        prochlorperazine injection Soln 5 mg  5 mg Intravenous Q6H PRN Patricia Miller, FNP   5 mg at 04/30/24 0053    senna-docusate 8.6-50 mg per tablet 1 tablet  1 tablet Oral BID PRN Patricia Miller, FNP        sodium chloride 0.9% flush 10 mL  10 mL Intravenous PRN Patriica Miller, FNP        sumatriptan tablet 50 mg  50 mg Oral Q2H PRN Alexis Mims MD   50 mg at 04/30/24 0508     Review of patient's allergies indicates:   Allergen Reactions    Sulfamethoxazole-trimethoprim Other (See Comments)     ROS: 12 point review of systems negative other than the HPI    PHYSICAL EXAM:  Vitals:    04/30/24 0417 04/30/24 0418 04/30/24 0500 04/30/24 0724   BP: 102/60   122/72   Pulse: 66   84   Resp: 17      Temp:   "97.9 °F (36.6 °C)  97.8 °F (36.6 °C)   TempSrc:  Oral  Oral   SpO2: 96%   98%   Weight:   66.5 kg (146 lb 9.6 oz)    Height:   5' 5" (1.651 m)      No intake or output data in the 24 hours ending 04/30/24 0852    GEN: WN/WD NAD  HEENT: NCAT, PERRLA, EOMI, OP clear, nares patent  CV: RRR  RESP: Even and unlabored  ABD: soft, NTND  : left CVA tenderness  EXT: no C/C/E  NEURO: no focal deficits, MAEW, AAOx4      LABS:  Recent Results (from the past 24 hour(s))   Comprehensive metabolic panel    Collection Time: 04/29/24  5:17 PM   Result Value Ref Range    Sodium Level 141 135 - 145 mmol/L    Potassium Level 3.3 (L) 3.5 - 5.1 mmol/L    Chloride 107 98 - 110 mmol/L    Carbon Dioxide 24 21 - 32 mmol/L    Glucose Level 107 70 - 115 mg/dL    Blood Urea Nitrogen 6.0 (L) 7.0 - 20.0 mg/dL    Creatinine 0.51 (L) 0.66 - 1.25 mg/dL    Calcium Level Total 8.5 8.4 - 10.2 mg/dL    Protein Total 7.2 6.3 - 8.2 gm/dL    Albumin Level 4.1 3.4 - 5.0 g/dL    Globulin 3.1 2.0 - 3.9 gm/dL    Albumin/Globulin Ratio 1.3 ratio    Bilirubin Total 0.3 0.0 - 1.0 mg/dL    Alkaline Phosphatase 76 50 - 144 unit/L    Alanine Aminotransferase 23 1 - 45 unit/L    Aspartate Aminotransferase 31 14 - 36 unit/L    eGFR >90 mls/min/1.73/m2    Anion Gap 10.0 2.0 - 13.0 mEq/L    BUN/Creatinine Ratio 12 12 - 20   Magnesium    Collection Time: 04/29/24  5:17 PM   Result Value Ref Range    Magnesium Level 1.90 1.80 - 2.40 mg/dL   CBC with Differential    Collection Time: 04/29/24  5:17 PM   Result Value Ref Range    WBC 7.89 4.00 - 11.50 x10(3)/mcL    RBC 3.78 (L) 4.00 - 5.10 x10(6)/mcL    Hgb 11.0 (L) 11.8 - 16.0 g/dL    Hct 33.9 (L) 36.0 - 48.0 %    MCV 89.7 79.0 - 99.0 fL    MCH 29.1 27.0 - 34.0 pg    MCHC 32.4 31.0 - 37.0 g/dL    RDW 14.2 11.0 - 14.5 %    Platelet 335 140 - 371 x10(3)/mcL    MPV 10.7 9.4 - 12.4 fL    Neut % 38.6 37 - 73 %    Lymph % 49.0 20 - 55 %    Mono % 6.1 4.7 - 12.5 %    Eos % 5.4 0.7 - 7 %    Basophil % 0.8 0.1 - 1.2 %    Lymph # " 3.87 (H) 1.16 - 3.74 x10(3)/mcL    Neut # 3.04 1.56 - 6.13 x10(3)/mcL    Mono # 0.48 (H) 0.24 - 0.36 x10(3)/mcL    Eos # 0.43 (H) 0.04 - 0.36 x10(3)/mcL    Baso # 0.06 0.01 - 0.08 x10(3)/mcL    IG# 0.01 0.0001 - 0.031 x10(3)/mcL    IG% 0.1 0 - 0.5 %    NRBC% 0.0 <=1 %   Urinalysis    Collection Time: 04/29/24  5:24 PM   Result Value Ref Range    Color, UA Brown (A) Yellow, Light-Yellow, Dark Yellow, Merary, Straw    Appearance, UA SL CLOUDY (A) Clear    Specific Gravity, UA 1.015 1.005 - 1.030    pH, UA 7.0 5.0 - 8.5    Protein, UA Negative Negative    Glucose, UA Negative Negative, Normal    Ketones, UA Negative Negative    Blood, UA Large (A) Negative    Bilirubin, UA Negative Negative    Urobilinogen, UA 0.2 0.2, 1.0, Normal    Nitrites, UA Negative Negative    Leukocyte Esterase, UA Negative Negative   HCG Qualitative Urine    Collection Time: 04/29/24  5:24 PM   Result Value Ref Range    Beta hCG Qualitative, Urine Negative Negative   Urinalysis, Microscopic    Collection Time: 04/29/24  5:24 PM   Result Value Ref Range    Bacteria, UA Occasional None Seen, Rare, Occasional /HPF    RBC, UA >100 (A) None Seen, 0-2, 3-5, 0-5 /HPF    Squamous Epithelial Cells, UA Occasional None Seen, Rare, Occasional, Occ /HPF   Comprehensive Metabolic Panel    Collection Time: 04/30/24  3:46 AM   Result Value Ref Range    Sodium Level 139 136 - 145 mmol/L    Potassium Level 3.7 3.5 - 5.1 mmol/L    Chloride 109 (H) 98 - 107 mmol/L    Carbon Dioxide 22 22 - 29 mmol/L    Glucose Level 112 (H) 74 - 100 mg/dL    Blood Urea Nitrogen 5.7 (L) 7.0 - 18.7 mg/dL    Creatinine 0.66 0.55 - 1.02 mg/dL    Calcium Level Total 8.7 8.4 - 10.2 mg/dL    Protein Total 6.2 (L) 6.4 - 8.3 gm/dL    Albumin Level 3.6 3.5 - 5.0 g/dL    Globulin 2.6 2.4 - 3.5 gm/dL    Albumin/Globulin Ratio 1.4 1.1 - 2.0 ratio    Bilirubin Total 0.3 <=1.5 mg/dL    Alkaline Phosphatase 66 40 - 150 unit/L    Alanine Aminotransferase 10 0 - 55 unit/L    Aspartate  Aminotransferase 15 5 - 34 unit/L    eGFR >60 mls/min/1.73/m2   Magnesium    Collection Time: 04/30/24  3:46 AM   Result Value Ref Range    Magnesium Level 1.90 1.60 - 2.60 mg/dL   CBC with Differential    Collection Time: 04/30/24  3:46 AM   Result Value Ref Range    WBC 7.81 4.50 - 11.50 x10(3)/mcL    RBC 3.55 (L) 4.20 - 5.40 x10(6)/mcL    Hgb 10.4 (L) 12.0 - 16.0 g/dL    Hct 32.4 (L) 37.0 - 47.0 %    MCV 91.3 80.0 - 94.0 fL    MCH 29.3 27.0 - 31.0 pg    MCHC 32.1 (L) 33.0 - 36.0 g/dL    RDW 14.6 11.5 - 17.0 %    Platelet 280 130 - 400 x10(3)/mcL    MPV 11.0 (H) 7.4 - 10.4 fL    Neut % 63.3 %    Lymph % 27.1 %    Mono % 5.6 %    Eos % 3.1 %    Basophil % 0.6 %    Lymph # 2.12 0.6 - 4.6 x10(3)/mcL    Neut # 4.94 2.1 - 9.2 x10(3)/mcL    Mono # 0.44 0.1 - 1.3 x10(3)/mcL    Eos # 0.24 0 - 0.9 x10(3)/mcL    Baso # 0.05 <=0.2 x10(3)/mcL    IG# 0.02 0 - 0.04 x10(3)/mcL    IG% 0.3 %    NRBC% 0.0 %         IMAGING:  EXAMINATION:  CT ABDOMEN PELVIS WITHOUT CONTRAST     CLINICAL HISTORY:  Flank pain;     TECHNIQUE:  Low dose axial images, sagittal and coronal reformations were obtained from the lung bases to the pubic symphysis.  Oral contrast was not administered.     COMPARISON:  04/30/2021     FINDINGS:  Liver:  No clinically significant abnormalities noted.     Gallbladder/Biliary System:  No clinically significant abnormalities noted.     Spleen:  No clinically significant abnormalities noted.     Adrenal glands:  No clinically significant abnormalities noted.     Pancreas:  No clinically significant abnormalities noted.     Kidneys/Urinary Tract: There is mild low-grade left-sided hydronephrosis and hydroureter associated with a 6 mm obstructing calculus at the level of the iliac crest.  Phlebolith like calcifications are present in the pelvic region on both sides.  The right kidney demonstrates multiple (nearly 10) radiopaque nonobstructing calculi, the largest measuring 5 mm.  The left kidney demonstrates multiple  (approximately 5) nonobstructing calculi, the largest measuring approximately 5 mm.     Urinary bladder:  No clinically significant abnormalities noted.     Prostate gland/uterus and ovaries:  No clinically significant abnormalities noted.     GI tract:  Formed stool is present in the region of the cecum and the ascending colon which can be seen with mild constipation.     Vascular structures:  No clinically significant abnormalities noted.     Musculoskeletal structures:  No clinically significant abnormalities noted.     Miscellaneous:  No clinically significant abnormalities noted.     Impression:        1. A 6 mm radiopaque obstructing calculus involving the left ureter at the level of the iliac crest with mild upstream left-sided hydronephrosis and hydroureter.     2.  Both kidneys demonstrate multiple small nonobstructing calculi within the calices measuring less than 5 mm in size.     3.  Formed stool is present in the region of the cecum and the ascending colon which can be seen with mild constipation.     n/a     CATEGORY: n/a     The following dose reduction techniques are used for all CT at [facility]:     1.   Automated exposure control.     2.   Adjustment of the mA and/or kV according to patient size.     3.   Use of iterative reconstruction technique.        Electronically signed by:Johann Nowak  Date:                                            04/29/2024  Time:                                           18:32           ASSESSMENT:  38-year-old female admitted with obstructing left distal ureteral stone, CT reviewed.  Also with associated intractable nausea and vomiting.  UA without suggestion of infection.  She is remained hemodynamically stable.      PLAN:  Discussed diagnosis, treatment options, risks and benefits.  Patient agreeable to cystoscopy, left ureteroscopy with laser litho, stone extraction and left ureteral stent placement today.  Informed consent  obtained.    HELIO Iqbal

## 2024-04-30 NOTE — OP NOTE
UROLOGY OPERATIVE NOTE    Edwin Ji  1985  [id number]  4/30/2024      Pre-op Diagnosis:  Left ureteral calculus    Post-op Diagnosis:  Same    Procedure: 1) Cystoscopy with bilateral retrograde pyelograms                      2) Left Ureteroscopy with Laser Lithotripsy, Calculus Removal                      3) Stent    Surgeon: Alexis Nicole    Anesthesia:  General    Complications:  None    Indications:  This is a 38-year-old lady with a history of stones.  She has been having left renal colic intermittently over the last several days.  Imaging revealed a 6 mm left mid ureteral stone, and bilateral renal calculi.  She also had a couple of calcifications along the expected course of the right distal ureter although she is asymptomatic on that side at this time.    Implants:  6x24 JJ Stent    Procedure Details:  Informed consent was obtained, the patient was brought to the cystoscopy area and given LMA anesthesia.  She was placed in lithotomy position and prepped and draped in normal sterile fashion.  Twenty-two Faroese cystoscope was introduced to the bladder.  Urethra was normal.  Bladder mucosa was inspected in its entirety, no mucosal lesions were seen.  Right retrograde pyelogram was performed demonstrating normal course and caliber ureter with no filling defects.  The 2 aforementioned calcifications in the right true pelvis seen on CT appeared to be outside of the urinary tract.  Left retrograde pyelogram was performed demonstrating mild hydronephrosis with a transition area the left mid ureter.  Two guidewires were placed up into the left kidney over 1 of which the 20 cm access sheath was placed.  The inner stylet and guidewire were then removed.  The flexible ureteral scope was then introduced into the ureter.  There was no stone seen along the upper course of the ureter.  The kidney was inspected in its entirety.  To stone/fragments were noted in upper pole calices which were grasped  using the ZeroTip basket and removed.  I then took a look at the entire ureter with the sheath removed and could see no stone or pathology.  I am not sure if 1 of the stones in the kidney was the ureteral calculus pushed up by the retrograde pyelogram.  In any event the ureteral scope was withdrawn.  Retrograde pyelogram was repeated showing no filling defects and normal course and caliber of the ureter with no extravasation.  Under fluoroscopic guidance the 6 x 24 double-J stent was placed.  Its correct position in the kidney and bladder were confirmed under fluoroscopy at the end of the procedure.  The bladder was drained.  The patient was awakened stable condition discharge recovery.    Disposition: Taken to the PACU in stable condition    Condition: Doing well without problems

## 2024-04-30 NOTE — ANESTHESIA PROCEDURE NOTES
Intubation    Date/Time: 4/30/2024 10:56 AM    Performed by: La Avilez CRNA  Authorized by: Peterson Murcia MD    Intubation:     Induction:  Rapid sequence induction    Intubated:  Postinduction    Mask Ventilation:  Not attempted    Attempts:  1    Attempted By:  CRNA    Method of Intubation:  Direct    Blade:  Steph 3    Laryngeal View Grade: Grade I - full view of cords      Difficult Airway Encountered?: No      Complications:  None    Airway Device:  Oral endotracheal tube    Airway Device Size:  7.0    Style/Cuff Inflation:  Cuffed (inflated to minimal occlusive pressure)    Tube secured:  21    Secured at:  The lips    Placement Verified By:  Capnometry    Complicating Factors:  Small mouth    Findings Post-Intubation:  BS equal bilateral and atraumatic/condition of teeth unchanged

## 2024-04-30 NOTE — ANESTHESIA POSTPROCEDURE EVALUATION
Anesthesia Post Evaluation    Patient: Edwin Ji    Procedure(s) Performed: Procedure(s) (LRB):  CYSTOSCOPY, WITH URETERAL STENT INSERTION (Left)  URETEROSCOPY (Left)    Final Anesthesia Type: general      Patient location during evaluation: PACU  Patient participation: Yes- Able to Participate  Level of consciousness: awake and alert and oriented  Post-procedure vital signs: reviewed and stable  Pain management: adequate  Airway patency: patent    PONV status at discharge: No PONV  Anesthetic complications: no      Cardiovascular status: hemodynamically stable  Respiratory status: unassisted  Hydration status: euvolemic  Follow-up not needed.              Vitals Value Taken Time   /74 04/30/24 1222   Temp 36.4 °C (97.6 °F) 04/30/24 1235   Pulse 92 04/30/24 1235   Resp 18 04/30/24 1230   SpO2 97 % 04/30/24 1235         Event Time   Out of Recovery 04/30/2024 12:30:00         Pain/Crystal Score: Pain Rating Prior to Med Admin: 6 (4/30/2024 12:26 PM)  Pain Rating Post Med Admin: 5 (4/30/2024  5:50 AM)  Crystal Score: 10 (4/30/2024 12:30 PM)

## 2024-04-30 NOTE — H&P
[facility] Hospital Medicine - H&P Note    Patient Name: Edwin Ji  : 1985  MRN: [id number]  PCP: Layne, Primary Doctor  Admitting Physician:  FLORIAN Mims MD  Admission Class: IP- Inpatient   Code status: Full    Allergies   Sulfamethoxazole-trimethoprim    Chief Complaint   Left flank pain, hematuria    History of Present Illness   38 yr old female whose history includes HTN and nephrolithiasis. Presented to outside ED with c/o one day history of left flank pain and hematuria. Labs showed mild hypokalemia and otherwise unremarkable. No evidence of UTI. CT abdomen and pelvis without contrast showed a 6 mm obstructing calculus involving the left ureter with mild upstream left-sided hydro nephrosis and hydro ureter.  Both kidneys demonstrate multiple small nonobstructing calculi within the calyces measuring less than 5 mm in size. Meds given in ED prior to transfer include KCL 40 meq, morphine, clonidine, sumatriptan, hydralazine and one liter NS. Transferred here for urology services. Denies any difficulty voiding.     ROS   Except as documented, all other systems reviewed and negative     Past Medical History   Hypertension  Depression/anxiety  Migraines  Hx nephrolithiasis  Hypothyroidism - not on supplement  Dyslipidemia    Past Surgical History   Cystoscopy with left ureteral stone extraction. No stent. - 3/26/21  Lithotripsy  Sinus surgery  Tympanoplasty with tube placement - Bilateral  Tonsillectomy and adenoidectomy      Social History   Current everyday smoker. Denies alcohol or illicit drug use.       Family History   Reviewed and negative    Home Medications     Fluoxetine 40 mg daily  Lisinopril 40 mg daily  Mobic 15 mg daily PRN  Rizatriptan 10 mg ODT daily PRN     Physical Exam   Vital Signs  Temp:  [98.1 °F (36.7 °C)-98.4 °F (36.9 °C)]   Pulse:  [63-82]   Resp:  [16-20]   BP: (114-195)/()   SpO2:  [98 %-100 %]    General: Appears comfortable  HEENT:  "NC/AT  Neck:  No JVD  Chest: CTABL  CVS: Regular rhythm. Normal S1/S2.  Abdomen: nondistended, normoactive BS, soft and non-tender.  MSK: No obvious deformity or joint swelling  Skin: Warm and dry  Neuro: AAOx3, no focal neurological deficit  Psych: Cooperative    Labs     Recent Labs     04/29/24  1717   WBC 7.89   RBC 3.78*   HGB 11.0*   HCT 33.9*   MCV 89.7   MCH 29.1   MCHC 32.4   RDW 14.2        No results for input(s): "PROTIME", "INR", "PTT", "D-DIMER", "FERRITIN", "IRON", "TRANS", "TIBC", "LABIRON", "DTLDCQUQ34", "FOLATE", "LDH", "HAPTOGLOBIN", "RETICCNTAUTO", "RETABS", "PERIPSMEAREV" in the last 72 hours.   Recent Labs     04/29/24  1717      K 3.3*   CHLORIDE 107   CO2 24   BUN 6.0*   CREATININE 0.51*   EGFRNORACEVR >90   GLUCOSE 107   CALCIUM 8.5   MG 1.90   ALBUMIN 4.1   GLOBULIN 3.1   ALKPHOS 76   ALT 23   AST 31   BILITOT 0.3     No results for input(s): "LACTIC" in the last 72 hours.  No results for input(s): "CPK", "TROPONINI" in the last 72 hours.     Microbiology Results (last 7 days)       ** No results found for the last 168 hours. **           Imaging   CT Abdomen Pelvis  Without Contrast  Narrative: EXAMINATION:  CT ABDOMEN PELVIS WITHOUT CONTRAST    CLINICAL HISTORY:  Flank pain;    TECHNIQUE:  Low dose axial images, sagittal and coronal reformations were obtained from the lung bases to the pubic symphysis.  Oral contrast was not administered.    COMPARISON:  04/30/2021    FINDINGS:  Liver:  No clinically significant abnormalities noted.    Gallbladder/Biliary System:  No clinically significant abnormalities noted.    Spleen:  No clinically significant abnormalities noted.    Adrenal glands:  No clinically significant abnormalities noted.    Pancreas:  No clinically significant abnormalities noted.    Kidneys/Urinary Tract: There is mild low-grade left-sided hydronephrosis and hydroureter associated with a 6 mm obstructing calculus at the level of the iliac crest.  Phlebolith like " calcifications are present in the pelvic region on both sides.  The right kidney demonstrates multiple (nearly 10) radiopaque nonobstructing calculi, the largest measuring 5 mm.  The left kidney demonstrates multiple (approximately 5) nonobstructing calculi, the largest measuring approximately 5 mm.    Urinary bladder:  No clinically significant abnormalities noted.    Prostate gland/uterus and ovaries:  No clinically significant abnormalities noted.    GI tract:  Formed stool is present in the region of the cecum and the ascending colon which can be seen with mild constipation.    Vascular structures:  No clinically significant abnormalities noted.    Musculoskeletal structures:  No clinically significant abnormalities noted.    Miscellaneous:  No clinically significant abnormalities noted.  Impression: 1. A 6 mm radiopaque obstructing calculus involving the left ureter at the level of the iliac crest with mild upstream left-sided hydronephrosis and hydroureter.    2.  Both kidneys demonstrate multiple small nonobstructing calculi within the calices measuring less than 5 mm in size.    3.  Formed stool is present in the region of the cecum and the ascending colon which can be seen with mild constipation.    n/a    CATEGORY: n/a    The following dose reduction techniques are used for all CT at [facility]:    1.   Automated exposure control.    2.   Adjustment of the mA and/or kV according to patient size.    3.   Use of iterative reconstruction technique.    Electronically signed by: Johann Nowak  Date:    04/29/2024  Time:    18:32    Assessment & Plan   Left ureteral obstructing stone with hydronephrosis and hydroureter  - urology consult - Dr. Nicole  - keep NPO  - Toradol PRN pain    Hypertension   - home med resumed  - PRN hydralazine    PMH: migraines, depression, anxiety, hypothyroidism    VTE Prophylaxis: Lakeshia GARVIN, Patricia Miller, FNP-BC have discussed this patients case with   Prasanna who agrees with the diagnosis and treatment plan.    ____________________________________________________________________  I, Dr. Alexis Mims assumed care of this patient.  For the patient encounter, I performed the substantive portion of the visit, I reviewed the NPPA documentation, treatment plan, and medical decision making.  I had face to face time with this patient.  I have personally reviewed the labs and test results that are presently available. I have reviewed or attempted to review medical records based upon their availability. If patient was admitted under observational status it is with my approval.      Pleasant 38-year-old female with 6 mm obstructing calculus left ureter with mild left-sided hydronephrosis and hydroureter.  Currently she has a benign abdomen without significant CVA tenderness.  NPO, urology consultation, analgesics, IV fluids.    Time seen: 4am 4/30  Alexis Mims MD

## 2024-05-01 VITALS
WEIGHT: 146.63 LBS | BODY MASS INDEX: 24.43 KG/M2 | SYSTOLIC BLOOD PRESSURE: 137 MMHG | HEART RATE: 83 BPM | OXYGEN SATURATION: 100 % | RESPIRATION RATE: 18 BRPM | HEIGHT: 65 IN | TEMPERATURE: 98 F | DIASTOLIC BLOOD PRESSURE: 88 MMHG

## 2024-05-01 PROBLEM — N20.0 KIDNEY STONE: Status: ACTIVE | Noted: 2024-05-01

## 2024-05-01 PROCEDURE — 63600175 PHARM REV CODE 636 W HCPCS: Performed by: NURSE PRACTITIONER

## 2024-05-01 PROCEDURE — 25000003 PHARM REV CODE 250: Performed by: NURSE PRACTITIONER

## 2024-05-01 RX ORDER — MUPIROCIN 20 MG/G
OINTMENT TOPICAL 2 TIMES DAILY
Status: DISCONTINUED | OUTPATIENT
Start: 2024-05-01 | End: 2024-05-01 | Stop reason: HOSPADM

## 2024-05-01 RX ORDER — SODIUM CHLORIDE, SODIUM GLUCONATE, SODIUM ACETATE, POTASSIUM CHLORIDE AND MAGNESIUM CHLORIDE 30; 37; 368; 526; 502 MG/100ML; MG/100ML; MG/100ML; MG/100ML; MG/100ML
INJECTION, SOLUTION INTRAVENOUS CONTINUOUS
Status: CANCELLED | OUTPATIENT
Start: 2024-05-01 | End: 2024-05-31

## 2024-05-01 RX ORDER — LIDOCAINE HYDROCHLORIDE 40 MG/ML
4 SOLUTION TOPICAL
Status: DISCONTINUED | OUTPATIENT
Start: 2024-05-01 | End: 2024-05-01

## 2024-05-01 RX ORDER — LIDOCAINE HYDROCHLORIDE 10 MG/ML
1 INJECTION, SOLUTION EPIDURAL; INFILTRATION; INTRACAUDAL; PERINEURAL ONCE
Status: CANCELLED | OUTPATIENT
Start: 2024-05-01 | End: 2024-05-01

## 2024-05-01 RX ORDER — LISINOPRIL 20 MG/1
40 TABLET ORAL NIGHTLY
Status: DISCONTINUED | OUTPATIENT
Start: 2024-05-01 | End: 2024-05-01 | Stop reason: HOSPADM

## 2024-05-01 RX ADMIN — ACETAMINOPHEN 1000 MG: 500 TABLET ORAL at 08:05

## 2024-05-01 RX ADMIN — KETOROLAC TROMETHAMINE 30 MG: 30 INJECTION, SOLUTION INTRAMUSCULAR; INTRAVENOUS at 11:05

## 2024-05-01 RX ADMIN — KETOROLAC TROMETHAMINE 30 MG: 30 INJECTION, SOLUTION INTRAMUSCULAR; INTRAVENOUS at 04:05

## 2024-05-01 NOTE — PROGRESS NOTES
[facility] Hospital Medicine Discharge Summary    Admit Date: 4/29/2024  Discharge Date and Time: 5/1/20243:01 PM  Admitting Physician: BRODERICK Team  Discharging Physician: Yudy Leon DO.  Primary Care Physician: Layne, Primary Doctor    Discharge Diagnoses:  Left ureteral obstructing stone with hydronephrosis and hydroureter   Hypertension   PMH: migraines, depression, anxiety, hypothyroidism     Hospital Course:   38 yr old female whose history includes HTN and nephrolithiasis. Presented to outside ED with c/o one day history of left flank pain and hematuria. Labs showed mild hypokalemia and otherwise unremarkable. No evidence of UTI. CT abdomen and pelvis without contrast showed a 6 mm obstructing calculus involving the left ureter with mild upstream left-sided hydro nephrosis and hydro ureter.  Both kidneys demonstrate multiple small nonobstructing calculi within the calyces measuring less than 5 mm in size. Meds given in ED prior to transfer include KCL 40 meq, morphine, clonidine, sumatriptan, hydralazine and one liter NS. Transferred here for urology services. Denies any difficulty voiding.     Pt was seen and examined on the day of discharge.  Patient with left ureteral calculus   Procedure: 1) Cystoscopy with bilateral retrograde pyelograms                      2) Left Ureteroscopy with Laser Lithotripsy, Calculus Removal                      3) Stent  Hospital course and discharge care plan has been discussed with patient, patient voices understanding and agreement with plan. All questions have been answered to the best of my ability. Patient is advised to return to ED or call 911 in case of emergency and or if symptoms worsen.    Vitals:  VITAL SIGNS: 24 HRS MIN & MAX LAST   Temp  Min: 97.9 °F (36.6 °C)  Max: 98.6 °F (37 °C) 97.9 °F (36.6 °C)   BP  Min: 104/67  Max: 151/88 137/88   Pulse  Min: 73  Max: 87  83   Resp  Min: 18  Max: 18 18   SpO2  Min: 95 %  Max: 100 % 100 %      procedures Performed: No admission procedures for hospital encounter.     Significant Diagnostic Studies: See Full reports for all details    Recent Labs   Lab 04/29/24  1717 04/30/24  0346   WBC 7.89 7.81   RBC 3.78* 3.55*   HGB 11.0* 10.4*   HCT 33.9* 32.4*   MCV 89.7 91.3   MCH 29.1 29.3   MCHC 32.4 32.1*   RDW 14.2 14.6    280   MPV 10.7 11.0*       Recent Labs   Lab 04/29/24  1717 04/30/24  0346    139   K 3.3* 3.7   CO2 24 22   BUN 6.0* 5.7*   CREATININE 0.51* 0.66   CALCIUM 8.5 8.7   MG 1.90 1.90   ALBUMIN 4.1 3.6   ALKPHOS 76 66   ALT 23 10   AST 31 15   BILITOT 0.3 0.3        Microbiology Results (last 7 days)       ** No results found for the last 168 hours. **             SURG FL Surgery Fluoro Usage  See OP Notes for results.     IMPRESSION: See OP Notes for results.     This procedure was auto-finalized by: Virtual Radiologist         Medication List        CONTINUE taking these medications      cloNIDine 0.1 MG tablet  Commonly known as: CATAPRES     FLUoxetine 40 MG capsule     lisinopriL 40 MG tablet  Commonly known as: PRINIVIL,ZESTRIL     meloxicam 15 MG tablet  Commonly known as: MOBIC     rizatriptan 10 MG disintegrating tablet  Commonly known as: MAXALT-MLT     tamsulosin 0.4 mg Cap  Commonly known as: FLOMAX  Take 1 capsule (0.4 mg total) by mouth once daily.               Explained in detail to the patient about the discharge plan, medications, and follow-up visits. Pt understands and agrees with the treatment plan  Discharge Disposition: Home or Self Care   Discharged Condition: stable  Diet-   Dietary Orders (From admission, onward)       Start     Ordered    05/01/24 0636  Diet Adult Regular  Diet effective now         05/01/24 0635                   Medications Per DC med rec  Activities as tolerated   Follow-up Information       Lauri Nicole MD. Go in 1 day(s).    Specialty: Urology  Why: appt scheduled 9:15am 5/2/2024  Contact information:  [address]  [place] [id number]  [phone]                           For further questions contact hospitalist office    Discharge time 33 minutes    For worsening symptoms, chest pain, shortness of breath, increased abdominal pain, high grade fever, stroke or stroke like symptoms, immediately go to the nearest Emergency Room or call 911 as soon as possible.      Yudy Cooper M.D, on 5/1/2024. at 3:01 PM.

## 2024-05-01 NOTE — NURSING
Anesthesia note from 05/01 @0847 is a mistake. Charted on incorrect patient, RT notified anesthesiologist.

## 2024-05-01 NOTE — PLAN OF CARE
Problem: Adult Inpatient Plan of Care  Goal: Plan of Care Review  5/1/2024 0751 by Caterina Irizarry RN  Outcome: Progressing  5/1/2024 0751 by Caterina Irizarry RN  Outcome: Progressing  Goal: Patient-Specific Goal (Individualized)  5/1/2024 0751 by Caterina Irizarry RN  Outcome: Progressing  5/1/2024 0751 by Caterina Irizarry RN  Outcome: Progressing  Goal: Absence of Hospital-Acquired Illness or Injury  5/1/2024 0751 by Caterina Irizarry RN  Outcome: Progressing  5/1/2024 0751 by Caterina Irizarry RN  Outcome: Progressing  Goal: Optimal Comfort and Wellbeing  5/1/2024 0751 by Caterina Irizarry RN  Outcome: Progressing  5/1/2024 0751 by Caterina Irizarry RN  Outcome: Progressing  Goal: Readiness for Transition of Care  5/1/2024 0751 by Caterina Irizarry RN  Outcome: Progressing  5/1/2024 0751 by Caterina Irizarry RN  Outcome: Progressing

## 2024-05-01 NOTE — PLAN OF CARE
05/01/24 0843   Final Note   Assessment Type Final Discharge Note   Anticipated Discharge Disposition Home   Post-Acute Status   Discharge Delays None known at this time

## 2024-05-01 NOTE — PLAN OF CARE
Problem: Adult Inpatient Plan of Care  Goal: Plan of Care Review  5/1/2024 1354 by Caterina Irizarry RN  Outcome: Met  5/1/2024 0751 by Caterina Irizarry RN  Outcome: Progressing  5/1/2024 0751 by Caterina Irizarry RN  Outcome: Progressing  Goal: Patient-Specific Goal (Individualized)  5/1/2024 1354 by Caterina Irizarry RN  Outcome: Met  5/1/2024 0751 by Caterina Irizarry RN  Outcome: Progressing  5/1/2024 0751 by Caterina Irizarry RN  Outcome: Progressing  Goal: Absence of Hospital-Acquired Illness or Injury  5/1/2024 1354 by Caterina Irizarry RN  Outcome: Met  5/1/2024 0751 by Caterina Irizarry RN  Outcome: Progressing  5/1/2024 0751 by Caterina Irizarry RN  Outcome: Progressing  Goal: Optimal Comfort and Wellbeing  5/1/2024 1354 by Caterina Irizarry RN  Outcome: Met  5/1/2024 0751 by Caterina Irizarry RN  Outcome: Progressing  5/1/2024 0751 by Caterina Irizarry RN  Outcome: Progressing  Goal: Readiness for Transition of Care  5/1/2024 1354 by Caterina Irizarry RN  Outcome: Met  5/1/2024 0751 by Caterina Irizarry RN  Outcome: Progressing  5/1/2024 0751 by Caterina Irizarry RN  Outcome: Progressing

## 2024-05-01 NOTE — PROGRESS NOTES
Discharge instructions reviewed with patient and spouse. Patient requesting independent ambulation with spouse to vehicle, in a rush to  children from school, states she does not want a wheelchair or escort.  with her. Stable upon discharge.

## 2024-05-03 ENCOUNTER — PATIENT MESSAGE (OUTPATIENT)
Dept: ADMINISTRATIVE | Facility: OTHER | Age: 39
End: 2024-05-03
Payer: MEDICAID

## 2024-05-08 LAB — PSYCHE PATHOLOGY RESULT: NORMAL

## 2024-05-13 ENCOUNTER — HOSPITAL ENCOUNTER (EMERGENCY)
Facility: HOSPITAL | Age: 39
Discharge: HOME | End: 2024-05-13
Payer: MEDICAID

## 2024-05-13 VITALS
BODY MASS INDEX: 24.16 KG/M2 | RESPIRATION RATE: 17 BRPM | HEIGHT: 65 IN | DIASTOLIC BLOOD PRESSURE: 97 MMHG | SYSTOLIC BLOOD PRESSURE: 143 MMHG | OXYGEN SATURATION: 98 % | HEART RATE: 104 BPM | WEIGHT: 145 LBS | TEMPERATURE: 98 F

## 2024-05-13 DIAGNOSIS — M62.830 BACK MUSCLE SPASM: Primary | ICD-10-CM

## 2024-05-13 PROCEDURE — 99284 EMERGENCY DEPT VISIT MOD MDM: CPT | Mod: 25

## 2024-05-13 PROCEDURE — 25000003 PHARM REV CODE 250: Performed by: NURSE PRACTITIONER

## 2024-05-13 PROCEDURE — 63600175 PHARM REV CODE 636 W HCPCS: Performed by: NURSE PRACTITIONER

## 2024-05-13 PROCEDURE — 96372 THER/PROPH/DIAG INJ SC/IM: CPT | Performed by: NURSE PRACTITIONER

## 2024-05-13 RX ORDER — METHOCARBAMOL 500 MG/1
500 TABLET, FILM COATED ORAL 4 TIMES DAILY
Qty: 28 TABLET | Refills: 0 | Status: SHIPPED | OUTPATIENT
Start: 2024-05-13 | End: 2024-05-20

## 2024-05-13 RX ORDER — DEXAMETHASONE SODIUM PHOSPHATE 4 MG/ML
8 INJECTION, SOLUTION INTRA-ARTICULAR; INTRALESIONAL; INTRAMUSCULAR; INTRAVENOUS; SOFT TISSUE
Status: COMPLETED | OUTPATIENT
Start: 2024-05-13 | End: 2024-05-13

## 2024-05-13 RX ORDER — METHOCARBAMOL 500 MG/1
1000 TABLET, FILM COATED ORAL
Status: COMPLETED | OUTPATIENT
Start: 2024-05-13 | End: 2024-05-13

## 2024-05-13 RX ORDER — KETOROLAC TROMETHAMINE 30 MG/ML
60 INJECTION, SOLUTION INTRAMUSCULAR; INTRAVENOUS
Status: COMPLETED | OUTPATIENT
Start: 2024-05-13 | End: 2024-05-13

## 2024-05-13 RX ADMIN — KETOROLAC TROMETHAMINE 60 MG: 30 INJECTION, SOLUTION INTRAMUSCULAR at 07:05

## 2024-05-13 RX ADMIN — METHOCARBAMOL 1000 MG: 500 TABLET ORAL at 07:05

## 2024-05-13 RX ADMIN — DEXAMETHASONE SODIUM PHOSPHATE 8 MG: 4 INJECTION, SOLUTION INTRA-ARTICULAR; INTRALESIONAL; INTRAMUSCULAR; INTRAVENOUS; SOFT TISSUE at 07:05

## 2024-05-14 NOTE — DISCHARGE SUMMARY
[facility] Medicine Discharge Summary    Admit Date: 4/29/2024  Discharge Date  5/01/24  Admitting Physician:  Team  Discharging Physician: Yudy Leon DO.  Primary Care Physician: Chandrika Hill FNP-C    Discharge Diagnoses:  Left ureteral obstructing stone with hydronephrosis and hydroureter   Hypertension   PMH: migraines, depression, anxiety, hypothyroidism     Hospital Course:   38 yr old female whose history includes HTN and nephrolithiasis. Presented to outside ED with c/o one day history of left flank pain and hematuria. Labs showed mild hypokalemia and otherwise unremarkable. No evidence of UTI. CT abdomen and pelvis without contrast showed a 6 mm obstructing calculus involving the left ureter with mild upstream left-sided hydro nephrosis and hydro ureter.  Both kidneys demonstrate multiple small nonobstructing calculi within the calyces measuring less than 5 mm in size. Meds given in ED prior to transfer include KCL 40 meq, morphine, clonidine, sumatriptan, hydralazine and one liter NS. Transferred here for urology services. Denies any difficulty voiding.     Pt was seen and examined on the day of discharge.  Patient with left ureteral calculus   Procedure: 1) Cystoscopy with bilateral retrograde pyelograms                      2) Left Ureteroscopy with Laser Lithotripsy, Calculus Removal                      3) Stent  Hospital course and discharge care plan has been discussed with patient, patient voices understanding and agreement with plan. All questions have been answered to the best of my ability. Patient is advised to return to ED or call 911 in case of emergency and or if symptoms worsen.    Vitals:  VITAL SIGNS: 24 HRS MIN & MAX LAST   No data recorded 97.9 °F (36.6 °C)   No data recorded 137/88   No data recorded  83   No data recorded 18   No data recorded 100 %     procedures Performed: No admission procedures for hospital encounter.  "    Significant Diagnostic Studies: See Full reports for all details    No results for input(s): "WBC", "RBC", "HGB", "HCT", "MCV", "MCH", "MCHC", "RDW", "PLT", "MPV", "GRAN", "LYMPH", "MONO", "BASO", "NRBC" in the last 168 hours.      No results for input(s): "NA", "K", "CL", "CO2", "ANIONGAP", "BUN", "CREATININE", "GLU", "CALCIUM", "PH", "MG", "ALBUMIN", "PROT", "ALKPHOS", "ALT", "AST", "BILITOT" in the last 168 hours.       Microbiology Results (last 7 days)       ** No results found for the last 168 hours. **             SURG FL Surgery Fluoro Usage  See OP Notes for results.     IMPRESSION: See OP Notes for results.     This procedure was auto-finalized by: Virtual Radiologist         Medication List        CONTINUE taking these medications      cloNIDine 0.1 MG tablet  Commonly known as: CATAPRES     FLUoxetine 40 MG capsule     lisinopriL 40 MG tablet  Commonly known as: PRINIVIL,ZESTRIL     meloxicam 15 MG tablet  Commonly known as: MOBIC     rizatriptan 10 MG disintegrating tablet  Commonly known as: MAXALT-MLT     tamsulosin 0.4 mg Cap  Commonly known as: FLOMAX  Take 1 capsule (0.4 mg total) by mouth once daily.               Explained in detail to the patient about the discharge plan, medications, and follow-up visits. Pt understands and agrees with the treatment plan  Discharge Disposition: Home or Self Care   Discharged Condition: stable  Diet-        Medications Per DC med rec  Activities as tolerated   Follow-up Information       Lauri Nicole MD. Go in 1 day(s).    Specialty: Urology  Why: appt scheduled 9:15am 5/2/2024  Contact information:  [address]  [address]  [facility] [address] [phone]                           For further questions contact hospitalist office    Discharge time 33 minutes    For worsening symptoms, chest pain, shortness of breath, increased abdominal pain, high grade fever, stroke or stroke like symptoms, immediately go to the nearest Emergency Room or call " 911 as soon as possible.      Yudy Leon. M.D  Date of Service 5/1/2024

## 2024-05-14 NOTE — ED PROVIDER NOTES
"Encounter Date: 5/13/2024       History     Chief Complaint   Patient presents with    Back Pain     Pt states "I think I went to work yesterday and did too much and pulled a muscle in my back". pt reports middle back pain on her right side.      38 year old female presents with mid upper back pain with is worse with movement and inspiration.  States today was the 1st day off of light duty and she pulled a muscle.    The history is provided by the patient. No  was used.     Review of patient's allergies indicates:   Allergen Reactions    Sulfamethoxazole-trimethoprim Other (See Comments)     Past Medical History:   Diagnosis Date    Anxiety     Depression     Hematuria     HTN (hypertension)     Kidney stone     Migraine headache     UTI (urinary tract infection)      Past Surgical History:   Procedure Laterality Date    CYSTOSCOPY W/ URETERAL STENT PLACEMENT Left 4/30/2024    Procedure: CYSTOSCOPY, WITH URETERAL STENT INSERTION;  Surgeon: Lauri Nicole MD;  Location: [facility];  Service: Urology;  Laterality: Left;    LITHOTRIPSY  2021    SINUS SURGERY      SINUS SURGERY      TONSILLECTOMY, ADENOIDECTOMY      TYMPANOSTOMY TUBE PLACEMENT      URETEROSCOPY Left 4/30/2024    Procedure: URETEROSCOPY;  Surgeon: Lauri Nicole MD;  Location: [facility];  Service: Urology;  Laterality: Left;     Family History   Problem Relation Name Age of Onset    Heart disease Mother      Hypertension Mother       Social History     Tobacco Use    Smoking status: Every Day   Substance Use Topics    Alcohol use: Not Currently     Review of Systems   Musculoskeletal:  Positive for back pain and myalgias.   All other systems reviewed and are negative.      Physical Exam     Initial Vitals [05/13/24 1903]   BP Pulse Resp Temp SpO2   (!) 152/99 104 18 98.3 °F (36.8 °C) 98 %      MAP       --         Physical Exam    Nursing note and vitals reviewed.  Constitutional: She appears well-developed and well-nourished. "   HENT:   Head: Normocephalic and atraumatic.   Eyes: Conjunctivae and EOM are normal. Pupils are equal, round, and reactive to light.   Neck: Neck supple.   Normal range of motion.  Cardiovascular:  Normal rate, regular rhythm, normal heart sounds and intact distal pulses.           Pulmonary/Chest: Breath sounds normal.   Abdominal: Abdomen is soft. Bowel sounds are normal.   Musculoskeletal:         General: Normal range of motion.      Cervical back: Normal range of motion and neck supple.      Thoracic back: Spasms and tenderness present.        Back:      Neurological: She is alert and oriented to person, place, and time. She has normal strength.   Skin: Skin is warm and dry. Capillary refill takes less than 2 seconds.   Psychiatric: She has a normal mood and affect. Her behavior is normal. Judgment and thought content normal.         ED Course   Procedures  Labs Reviewed - No data to display       Imaging Results    None          Medications   dexAMETHasone injection 8 mg (has no administration in time range)   ketorolac injection 60 mg (has no administration in time range)   methocarbamoL tablet 1,000 mg (has no administration in time range)     Medical Decision Making  Problems Addressed:  Back muscle spasm: acute illness or injury    Risk  Prescription drug management.                                      Clinical Impression:  Final diagnoses:  [M62.830] Back muscle spasm (Primary)          ED Disposition Condition    Discharge Stable          ED Prescriptions       Medication Sig Dispense Start Date End Date Auth. Provider    methocarbamoL (ROBAXIN) 500 MG Tab Take 1 tablet (500 mg total) by mouth 4 (four) times daily. for 7 days 28 tablet 5/13/2024 5/20/2024 Dania Abad FNP          Follow-up Information       Follow up With Specialties Details Why Contact Info    Chandrika Hill FNP-C Family Medicine In 3 days If symptoms worsen [address] [facility]  [facility]  [address]  [phone]               Dania Abad, [facility]  05/13/24 1920       Dania Abad, [facility]  05/13/24 1920       Dania Abad [facility]  05/13/24 1921

## 2025-02-17 ENCOUNTER — HOSPITAL ENCOUNTER (EMERGENCY)
Facility: HOSPITAL | Age: 40
Discharge: HOME | End: 2025-02-17
Payer: MEDICAID

## 2025-02-17 VITALS
HEART RATE: 62 BPM | SYSTOLIC BLOOD PRESSURE: 158 MMHG | WEIGHT: 135 LBS | BODY MASS INDEX: 22.49 KG/M2 | RESPIRATION RATE: 18 BRPM | OXYGEN SATURATION: 100 % | DIASTOLIC BLOOD PRESSURE: 98 MMHG | TEMPERATURE: 98 F | HEIGHT: 65 IN

## 2025-02-17 DIAGNOSIS — R07.81 RIB PAIN ON RIGHT SIDE: ICD-10-CM

## 2025-02-17 PROCEDURE — 63600175 PHARM REV CODE 636 W HCPCS: Performed by: NURSE PRACTITIONER

## 2025-02-17 PROCEDURE — 99284 EMERGENCY DEPT VISIT MOD MDM: CPT | Mod: 25

## 2025-02-17 PROCEDURE — 96372 THER/PROPH/DIAG INJ SC/IM: CPT | Performed by: NURSE PRACTITIONER

## 2025-02-17 RX ORDER — KETOROLAC TROMETHAMINE 30 MG/ML
60 INJECTION, SOLUTION INTRAMUSCULAR; INTRAVENOUS
Status: COMPLETED | OUTPATIENT
Start: 2025-02-17 | End: 2025-02-17

## 2025-02-17 RX ORDER — DEXAMETHASONE SODIUM PHOSPHATE 4 MG/ML
8 INJECTION, SOLUTION INTRA-ARTICULAR; INTRALESIONAL; INTRAMUSCULAR; INTRAVENOUS; SOFT TISSUE
Status: COMPLETED | OUTPATIENT
Start: 2025-02-17 | End: 2025-02-17

## 2025-02-17 RX ADMIN — DEXAMETHASONE SODIUM PHOSPHATE 8 MG: 4 INJECTION, SOLUTION INTRA-ARTICULAR; INTRALESIONAL; INTRAMUSCULAR; INTRAVENOUS; SOFT TISSUE at 05:02

## 2025-02-17 RX ADMIN — KETOROLAC TROMETHAMINE 60 MG: 30 INJECTION, SOLUTION INTRAMUSCULAR at 05:02

## 2025-02-17 NOTE — Clinical Note
"Edwin Saldivaruri Ji was seen and treated in our emergency department on 2/17/2025.  She may return to work on 02/18/2025.       If you have any questions or concerns, please don't hesitate to call.      Dania Abad FNP"

## 2025-02-17 NOTE — ED PROVIDER NOTES
Encounter Date: 2/17/2025       History     Chief Complaint   Patient presents with    Rib Pain     Pt reports she has been having right sided upper rib pain for the last week and sneezed earlier and felt a pop and now it hurts even more. Denies any recent injuries     39 year old female presents with right rib pain after sneezing this morning and hearing a POP. Denies injury.     The history is provided by the patient. No  was used.     Review of patient's allergies indicates:   Allergen Reactions    Sulfamethoxazole-trimethoprim Other (See Comments)     Past Medical History:   Diagnosis Date    Anxiety     Depression     Hematuria     HTN (hypertension)     Kidney stone     Migraine headache     UTI (urinary tract infection)      Past Surgical History:   Procedure Laterality Date    CYSTOSCOPY W/ URETERAL STENT PLACEMENT Left 4/30/2024    Procedure: CYSTOSCOPY, WITH URETERAL STENT INSERTION;  Surgeon: Lauri Nicole MD;  Location: [facility];  Service: Urology;  Laterality: Left;    LITHOTRIPSY  2021    SINUS SURGERY      SINUS SURGERY      TONSILLECTOMY, ADENOIDECTOMY      TYMPANOSTOMY TUBE PLACEMENT      URETEROSCOPY Left 4/30/2024    Procedure: URETEROSCOPY;  Surgeon: Lauri Nicole MD;  Location: [facility];  Service: Urology;  Laterality: Left;     Family History   Problem Relation Name Age of Onset    Heart disease Mother      Hypertension Mother       Social History[1]  Review of Systems   Musculoskeletal:  Positive for arthralgias.   All other systems reviewed and are negative.      Physical Exam     Initial Vitals [02/17/25 1539]   BP Pulse Resp Temp SpO2   (!) 167/104 68 14 98.3 °F (36.8 °C) 100 %      MAP       --         Physical Exam    Nursing note and vitals reviewed.  Constitutional: She appears well-developed and well-nourished.   HENT:   Head: Normocephalic and atraumatic.   Eyes: Conjunctivae and EOM are normal. Pupils are equal, round, and reactive to light.   Neck: Neck  supple.   Normal range of motion.  Cardiovascular:  Normal rate, regular rhythm, normal heart sounds and intact distal pulses.           Pulmonary/Chest: Effort normal and breath sounds normal. She exhibits tenderness.     Abdominal: Abdomen is soft. Bowel sounds are normal.   Musculoskeletal:         General: Normal range of motion.      Cervical back: Normal range of motion and neck supple.     Neurological: She is alert and oriented to person, place, and time. She has normal strength.   Skin: Skin is warm and dry. Capillary refill takes less than 2 seconds.   Psychiatric: She has a normal mood and affect. Her behavior is normal. Judgment and thought content normal.         ED Course   Procedures  Labs Reviewed - No data to display       Imaging Results              X-Ray Ribs 2 View Right (Preliminary result)  Result time 02/17/25 17:12:46      Wet Read by Dania Abad FNP (02/17/25 17:12:46, [facility])    No acute fracture                                     Medications   dexAMETHasone injection 8 mg (has no administration in time range)   ketorolac injection 60 mg (has no administration in time range)     Medical Decision Making  Problems Addressed:  Rib pain on right side: acute illness or injury    Amount and/or Complexity of Data Reviewed  Radiology: ordered and independent interpretation performed. Decision-making details documented in ED Course.    Risk  Prescription drug management.                                      Clinical Impression:  Final diagnoses:  [R07.81] Rib pain on right side          ED Disposition Condition    Discharge Stable          ED Prescriptions    None       Follow-up Information       Follow up With Specialties Details Why Contact Info    Chandrika Hill FNP-C Family Medicine In 3 days If symptoms worsen [facility] [place] [address]  [phone]                   [1]   Social History  Tobacco Use     Smoking status: Every Day   Substance Use Topics    Alcohol use: Not Currently        Dania Abad, HELIO  02/17/25 7975

## 2025-04-10 ENCOUNTER — HOSPITAL ENCOUNTER (EMERGENCY)
Facility: HOSPITAL | Age: 40
Discharge: HOME | End: 2025-04-10
Attending: FAMILY MEDICINE
Payer: MEDICAID

## 2025-04-10 VITALS
SYSTOLIC BLOOD PRESSURE: 166 MMHG | OXYGEN SATURATION: 100 % | BODY MASS INDEX: 22.49 KG/M2 | HEIGHT: 65 IN | HEART RATE: 80 BPM | WEIGHT: 135 LBS | DIASTOLIC BLOOD PRESSURE: 93 MMHG | RESPIRATION RATE: 16 BRPM | TEMPERATURE: 99 F

## 2025-04-10 DIAGNOSIS — N20.0 NEPHROLITHIASIS: Primary | ICD-10-CM

## 2025-04-10 DIAGNOSIS — N83.209 CYST OF OVARY, UNSPECIFIED LATERALITY: ICD-10-CM

## 2025-04-10 LAB
ALBUMIN SERPL-MCNC: 4.7 G/DL (ref 3.4–5)
ALBUMIN/GLOB SERPL: 1.7 RATIO
ALP SERPL-CCNC: 64 UNIT/L (ref 50–144)
ALT SERPL-CCNC: 16 UNIT/L (ref 1–45)
ANION GAP SERPL CALC-SCNC: 3 MEQ/L (ref 2–13)
APTT PPP: 24.5 SECONDS (ref 23–29.4)
AST SERPL-CCNC: 28 UNIT/L (ref 14–36)
B-HCG UR QL: NEGATIVE
BACTERIA #/AREA URNS AUTO: ABNORMAL /HPF
BASOPHILS # BLD AUTO: 0.05 X10(3)/MCL (ref 0.01–0.08)
BASOPHILS NFR BLD AUTO: 0.5 % (ref 0.1–1.2)
BILIRUB SERPL-MCNC: 0.4 MG/DL (ref 0–1)
BILIRUB UR QL STRIP.AUTO: NEGATIVE
BUN SERPL-MCNC: 9 MG/DL (ref 7–20)
CALCIUM SERPL-MCNC: 9.5 MG/DL (ref 8.4–10.2)
CHLORIDE SERPL-SCNC: 107 MMOL/L (ref 98–110)
CLARITY UR: ABNORMAL
CO2 SERPL-SCNC: 26 MMOL/L (ref 21–32)
COLOR UR AUTO: YELLOW
CREAT SERPL-MCNC: 0.6 MG/DL (ref 0.66–1.25)
CREAT/UREA NIT SERPL: 15 (ref 12–20)
EOSINOPHIL # BLD AUTO: 0.34 X10(3)/MCL (ref 0.04–0.36)
EOSINOPHIL NFR BLD AUTO: 3.6 % (ref 0.7–7)
ERYTHROCYTE [DISTWIDTH] IN BLOOD BY AUTOMATED COUNT: 13.8 % (ref 11–14.5)
GFR SERPLBLD CREATININE-BSD FMLA CKD-EPI: >90 ML/MIN/1.73/M2
GLOBULIN SER-MCNC: 2.8 GM/DL (ref 2–3.9)
GLUCOSE SERPL-MCNC: 83 MG/DL (ref 70–115)
GLUCOSE UR QL STRIP: NEGATIVE
HCT VFR BLD AUTO: 34.8 % (ref 36–48)
HGB BLD-MCNC: 11.2 G/DL (ref 11.8–16)
HGB UR QL STRIP: ABNORMAL
IMM GRANULOCYTES # BLD AUTO: 0.03 X10(3)/MCL (ref 0–0.03)
IMM GRANULOCYTES NFR BLD AUTO: 0.3 % (ref 0–0.5)
INR PPP: 1
KETONES UR QL STRIP: NEGATIVE
LEUKOCYTE ESTERASE UR QL STRIP: ABNORMAL
LYMPHOCYTES # BLD AUTO: 3.3 X10(3)/MCL (ref 1.16–3.74)
LYMPHOCYTES NFR BLD AUTO: 35.1 % (ref 20–55)
MCH RBC QN AUTO: 29.9 PG (ref 27–34)
MCHC RBC AUTO-ENTMCNC: 32.2 G/DL (ref 31–37)
MCV RBC AUTO: 92.8 FL (ref 79–99)
MONOCYTES # BLD AUTO: 0.58 X10(3)/MCL (ref 0.24–0.36)
MONOCYTES NFR BLD AUTO: 6.2 % (ref 4.7–12.5)
NEUTROPHILS # BLD AUTO: 5.09 X10(3)/MCL (ref 1.56–6.13)
NEUTROPHILS NFR BLD AUTO: 54.3 % (ref 37–73)
NITRITE UR QL STRIP: NEGATIVE
NRBC BLD AUTO-RTO: 0 %
PH UR STRIP: 6 [PH]
PLATELET # BLD AUTO: 290 X10(3)/MCL (ref 140–371)
PMV BLD AUTO: 10 FL (ref 9.4–12.4)
POTASSIUM SERPL-SCNC: 3.6 MMOL/L (ref 3.5–5.1)
PROT SERPL-MCNC: 7.5 GM/DL (ref 6.3–8.2)
PROT UR QL STRIP: NEGATIVE
PROTHROMBIN TIME: 10.4 SECONDS (ref 9.3–11.9)
RBC # BLD AUTO: 3.75 X10(6)/MCL (ref 4–5.1)
RBC #/AREA URNS AUTO: >100 /HPF
SODIUM SERPL-SCNC: 136 MMOL/L (ref 136–145)
SP GR UR STRIP.AUTO: 1.01 (ref 1–1.03)
SQUAMOUS #/AREA URNS AUTO: ABNORMAL /HPF
UROBILINOGEN UR STRIP-ACNC: 0.2
WBC # BLD AUTO: 9.39 X10(3)/MCL (ref 4–11.5)
WBC #/AREA URNS AUTO: ABNORMAL /HPF

## 2025-04-10 PROCEDURE — 85730 THROMBOPLASTIN TIME PARTIAL: CPT | Performed by: FAMILY MEDICINE

## 2025-04-10 PROCEDURE — 85610 PROTHROMBIN TIME: CPT | Performed by: FAMILY MEDICINE

## 2025-04-10 PROCEDURE — 81003 URINALYSIS AUTO W/O SCOPE: CPT | Performed by: FAMILY MEDICINE

## 2025-04-10 PROCEDURE — 81015 MICROSCOPIC EXAM OF URINE: CPT | Performed by: FAMILY MEDICINE

## 2025-04-10 PROCEDURE — 99284 EMERGENCY DEPT VISIT MOD MDM: CPT | Mod: 25

## 2025-04-10 PROCEDURE — 85025 COMPLETE CBC W/AUTO DIFF WBC: CPT | Performed by: FAMILY MEDICINE

## 2025-04-10 PROCEDURE — 81025 URINE PREGNANCY TEST: CPT | Performed by: FAMILY MEDICINE

## 2025-04-10 PROCEDURE — 80053 COMPREHEN METABOLIC PANEL: CPT | Performed by: FAMILY MEDICINE

## 2025-04-10 RX ORDER — TAMSULOSIN HYDROCHLORIDE 0.4 MG/1
0.4 CAPSULE ORAL DAILY
Qty: 30 CAPSULE | Refills: 0 | Status: SHIPPED | OUTPATIENT
Start: 2025-04-10 | End: 2025-05-10

## 2025-04-10 RX ORDER — KETOROLAC TROMETHAMINE 10 MG/1
10 TABLET, FILM COATED ORAL EVERY 8 HOURS PRN
Qty: 15 TABLET | Refills: 0 | Status: SHIPPED | OUTPATIENT
Start: 2025-04-10 | End: 2025-04-15

## 2025-04-10 RX ORDER — ONDANSETRON 4 MG/1
4 TABLET, FILM COATED ORAL EVERY 6 HOURS PRN
Qty: 30 TABLET | Refills: 0 | Status: SHIPPED | OUTPATIENT
Start: 2025-04-10

## 2025-04-10 RX ORDER — HYDROCODONE BITARTRATE AND ACETAMINOPHEN 7.5; 325 MG/1; MG/1
1 TABLET ORAL EVERY 6 HOURS PRN
Qty: 12 TABLET | Refills: 0 | Status: SHIPPED | OUTPATIENT
Start: 2025-04-10

## 2025-04-10 NOTE — DISCHARGE INSTRUCTIONS
Call your urologist today or tomorrow to follow up regarding the blood in her urine and your kidney stones.  You should contact your gyn doctor or your primary care to follow up on your ovarian cysts.

## 2025-04-10 NOTE — ED PROVIDER NOTES
Encounter Date: 4/10/2025       History     Chief Complaint   Patient presents with    Hematuria     B/p at home 173/101 at home, dys consistently >100,  humming to bilat ears, recurrent HA Small amt hematuria with slight left flank pain      Patient complains of elevated blood pressure.  She takes lisinopril 40 mg a day.  It has been over 3 months since she saw her PCP.  No fevers chills sweats.  No nausea or vomiting.  No chest pain or shortness for breath.  She complains of some ringing in her ears.  No headache.  No numbness or weakness.  She also complains of some left flank pain and hematuria.  She has had history of kidney stones in the past she complains of nausea without vomiting.  She states that that is improved.        Review of patient's allergies indicates:   Allergen Reactions    Sulfamethoxazole-trimethoprim Other (See Comments)     Past Medical History:   Diagnosis Date    Anxiety     Depression     Hematuria     HTN (hypertension)     Kidney stone     Migraine headache     UTI (urinary tract infection)      Past Surgical History:   Procedure Laterality Date    CYSTOSCOPY W/ URETERAL STENT PLACEMENT Left 4/30/2024    Procedure: CYSTOSCOPY, WITH URETERAL STENT INSERTION;  Surgeon: Lauri Nicole MD;  Location: [facility];  Service: Urology;  Laterality: Left;    LITHOTRIPSY  2021    SINUS SURGERY      SINUS SURGERY      TONSILLECTOMY, ADENOIDECTOMY      TYMPANOSTOMY TUBE PLACEMENT      URETEROSCOPY Left 4/30/2024    Procedure: URETEROSCOPY;  Surgeon: Lauri Nicole MD;  Location: [facility];  Service: Urology;  Laterality: Left;     Family History   Problem Relation Name Age of Onset    Heart disease Mother      Hypertension Mother       Social History[1]  Review of Systems   Constitutional: Negative.    HENT: Negative.     Respiratory: Negative.     Cardiovascular: Negative.         Elevated blood pressure   Gastrointestinal:  Positive for nausea and vomiting.   Genitourinary:  Positive for  flank pain and hematuria.       Physical Exam     Initial Vitals [04/10/25 1433]   BP Pulse Resp Temp SpO2   (!) 173/104 83 16 98.5 °F (36.9 °C) 100 %      MAP       --         Physical Exam    Constitutional: She appears well-developed and well-nourished.   HENT:   Head: Normocephalic and atraumatic.   Bilateral TM with fluid.  No erythema or bulging.  Right TM landmarks are obscured by the fluid collection.   Cardiovascular:  Normal rate and regular rhythm.           Pulmonary/Chest: Breath sounds normal.   Abdominal: Abdomen is soft. Bowel sounds are normal.   Genitourinary:    Genitourinary Comments: Left flank/CVA tenderness mild       Skin: Skin is dry.         ED Course   Procedures  Labs Reviewed   URINALYSIS, REFLEX TO URINE CULTURE - Abnormal       Result Value    Color, UA Yellow      Appearance, UA Slightly Cloudy (*)     Specific Gravity, UA 1.010      pH, UA 6.0      Protein, UA Negative      Glucose, UA Negative      Ketones, UA Negative      Blood, UA Large (*)     Bilirubin, UA Negative      Urobilinogen, UA 0.2      Nitrites, UA Negative      Leukocyte Esterase, UA Trace (*)     Narrative:      URINE STABILITY IS 2 HOURS AT ROOM TEMP OR    SIX HOURS REFRIGERATED. PERFORMING TESTING ON    SPECIMENS GREATER THAN THIS AGE MAY AFFECT THE    FOLLOWING TESTS:    PH          SPECIFIC GRAVITY           BLOOD    CLARITY     BILIRUBIN               UROBILINOGEN   COMPREHENSIVE METABOLIC PANEL - Abnormal    Sodium 136      Potassium 3.6      Chloride 107      CO2 26      Glucose 83      Blood Urea Nitrogen 9      Creatinine 0.60 (*)     Calcium 9.5      Protein Total 7.5      Albumin 4.7      Globulin 2.8      Albumin/Globulin Ratio 1.7      Bilirubin Total 0.4      ALP 64      ALT 16      AST 28      eGFR >90      Anion Gap 3.0      BUN/Creatinine Ratio 15     CBC WITH DIFFERENTIAL - Abnormal    WBC 9.39      RBC 3.75 (*)     Hgb 11.2 (*)     Hct 34.8 (*)     MCV 92.8      MCH 29.9      MCHC 32.2      RDW  13.8      Platelet 290      MPV 10.0      Neut % 54.3      Lymph % 35.1      Mono % 6.2      Eos % 3.6      Basophil % 0.5      Lymph # 3.30      Neut # 5.09      Mono # 0.58 (*)     Eos # 0.34      Baso # 0.05      Imm Gran # 0.03      Imm Grans % 0.3      NRBC% 0.0     URINALYSIS, MICROSCOPIC - Abnormal    Bacteria, UA Few (*)     RBC, UA >100 (*)     WBC, UA 0-5      Squamous Epithelial Cells, UA Occasional     HCG QUALITATIVE URINE - Normal    hCG Qualitative, Urine Negative     CBC W/ AUTO DIFFERENTIAL    Narrative:     The following orders were created for panel order CBC auto differential.  Procedure                               Abnormality         Status                     ---------                               -----------         ------                     CBC with Differential[[id number]]       Abnormal            Final result                 Please view results for these tests on the individual orders.   PROTIME-INR   APTT          Imaging Results              CT Abdomen Pelvis  Without Contrast (Final result)  Result time 04/10/25 16:12:14      Final result by Johann Nowak MD (04/10/25 16:12:14)                   Impression:        1. Multiple small radiopaque nonobstructing calculi are noted involving the calices of both kidneys. Phlebolith like calcifications are noted along the expected course of the ureters and I see no evidence of obstructive uropathy.    2.  A 4.5 x 6 cm cystic structure is present in the right adnexa.  A 2.5 x 3.7 cm cystic appearing structure is also noted in the left adnexa. Further evaluation with ultrasonography is recommended.    n/a    CATEGORY: n/a    The following dose reduction techniques are used for all CT at [facility]:    1.   Automated exposure control.    2.   Adjustment of the mA and/or kV according to patient size.    3.   Use of iterative reconstruction technique.      Electronically signed by: Johann  She  Date:    04/10/2025  Time:    16:12               Narrative:    EXAMINATION:  CT ABDOMEN PELVIS WITHOUT CONTRAST    CLINICAL HISTORY:  hematuria;    TECHNIQUE:  Low dose axial images, sagittal and coronal reformations were obtained from the lung bases to the pubic symphysis.  Oral contrast was not administered.    COMPARISON:  04/29/2024    FINDINGS:  Liver:  No clinically significant abnormalities noted.    Gallbladder/Biliary System:  No clinically significant abnormalities noted.    Spleen:  No clinically significant abnormalities noted.    Adrenal glands:  No clinically significant abnormalities noted.    Pancreas:  No clinically significant abnormalities noted.    Kidneys/Urinary Tract:  Multiple small radiopaque nonobstructing calculi are noted involving the calices of both kidneys.  Phlebolith like calcifications are noted along the expected course of the ureters and I see no evidence of obstructive uropathy.    Urinary bladder:  No clinically significant abnormalities noted.    Prostate gland/uterus and ovaries:  A 4.5 x 6 cm cystic structure is present in the right adnexa.  A 2.5 x 3.7 cm cystic appearing structure is also noted in the left adnexa.  Further evaluation with ultrasonography is recommended.    GI tract:  No clinically significant abnormalities noted.  I see no evidence of acute appendicitis.    Vascular structures:  No clinically significant abnormalities noted.    Musculoskeletal structures:  No clinically significant abnormalities noted.    Miscellaneous:  No clinically significant abnormalities noted.                                       Medications - No data to display  Medical Decision Making  Amount and/or Complexity of Data Reviewed  Labs: ordered.  Radiology: ordered.    Risk  Prescription drug management.      Additional MDM:   Differential Diagnosis:   Hypertension, nephritic syndrome, renal mass, urolithiasis, UTI                                    Clinical  Impression:  Final diagnoses:  [N20.0] Nephrolithiasis (Primary)  [N83.209] Cyst of ovary, unspecified laterality          ED Disposition Condition    Discharge Stable          ED Prescriptions       Medication Sig Dispense Start Date End Date Auth. Provider    tamsulosin (FLOMAX) 0.4 mg Cap Take 1 capsule (0.4 mg total) by mouth once daily. for 30 doses 30 capsule 4/10/2025 5/10/2025 Zackery Ching MD    ketorolac (TORADOL) 10 mg tablet Take 1 tablet (10 mg total) by mouth every 8 (eight) hours as needed. 15 tablet 4/10/2025 4/15/2025 Zackery Ching MD    ondansetron (ZOFRAN) 4 MG tablet Take 1 tablet (4 mg total) by mouth every 6 (six) hours as needed. 30 tablet 4/10/2025 -- Zackery Ching MD    HYDROcodone-acetaminophen (NORCO) 7.5-325 mg per tablet Take 1 tablet by mouth every 6 (six) hours as needed for Pain. 12 tablet 4/10/2025 -- Zackery Ching MD          Follow-up Information       Follow up With Specialties Details Why Contact Info    Chandrika Hill FNP-C Family Medicine In 2 days  [address] [facility] [address]  [phone]                 Zackery Ching MD  04/10/25 8969         [1]   Social History  Tobacco Use    Smoking status: Every Day     Current packs/day: 0.50     Types: Cigarettes   Substance Use Topics    Alcohol use: Not Currently        Zackery Ching MD  04/10/25 9800

## 2025-04-10 NOTE — ED NOTES
Informed by ANTOINE Lester that an assessment was not completed prior to patient being transported to room 133.

## 2025-04-28 NOTE — PROGRESS NOTES
Chief Complaint:   Well Woman     History of Present Illness:  Edwin Ji is a 39 y.o. year old  presents for her well woman exam. Currently relying on nothing for birth control. Patient's last menstrual period was 2025 (approximate).. Monthly cycles alting 5 days with heavy flow. Painful.     Reports night sweats.     Cancer-related family history is negative for Breast cancer, Ovarian cancer, and Uterine cancer.        Gyn History:  Menstrual History  Cycle: Yes  Menarche Age: 12 years  Flow Duration: 5  Flow: (!) Heavy  Interval: 28  Intermenstrual Bleeding: No  Dysmenorrhea: Yes  Dysmenorrhea Severity : (!) Severe    Menopause  Menopause Age: 1 years    Pap History  Last pap date: 21 (NIL)  Result: Normal  History of Abnormal Pap: (!) Yes  Treatment used: LEEP ( LEEP ([facility]))  HPV Vaccine Completed: No (0/3)    [place]  Sexually Active: Yes  Sexual Orientation: heterosexual  Postcoital Bleeding: No  Dyspareunia: Yes  STI History: No  Contraception: No    Breast History  Last Breast Imaging Date: No  History of Breast Biopsy: No        Review of Systems:  General/Constitutional: Chills denies. Fatigue/weakness denies. Fever denies. Night sweats admits. Hot flashes denies    Respiratory: Cough denies. Hemoptysis denies. SOB denies. Sputum production denies. Wheezing denies .   Cardiovascular: Chest pain denies. Dizziness denies. Palpitations denies. Swelling in hands/feet denies.                Breast: Dimpling denies. Breast mass denies. Breast pain/tenderness denies. Nipple discharge denies. Puckering denies.  Gastrointestinal: Abdominal pain denies. Blood in stool denies. Constipation denies. Diarrhea denies. Heartburn denies. Nausea denies. Vomiting denies    Genitourinary: Incontinence denies. Blood in urine denies. Frequent urination denies. Painful urination denies. Urinary urgency denies. Nocturia denies    Gynecologic: Irregular menses denies. Heavy  bleeding admits. Painful menses admits. Vaginal discharge denies. Vaginal odor denies. Vaginal itching denies. Vaginal lesion denies. Pelvic pain denies. Decreased libido denies. Vulvar lesion denies. Prolapse of genital organs denies. Painful intercourse admits. Postcoital bleeding denies    Psychiatric: Depression denies. Anxiety denies.     OB History    Para Term  AB Living   4 2 2 0 2 2   SAB IAB Ectopic Multiple Live Births   2 0 0 0 2      # 1 - Date: 03, Sex: Female, Weight: 3.345 kg (7 lb 6 oz), GA: None, Type: Vaginal, Spontaneous, Apgar1: None, Apgar5: None, Living: Living, Birth Comments: None    # 2 - Date: 16, Sex: Male, Weight: 3.204 kg (7 lb 1 oz), GA: 37w0d, Type: Vaginal, Spontaneous, Apgar1: None, Apgar5: None, Living: Living, Birth Comments: None    # 3 - Date: , Sex: None, Weight: None, GA: 6w0d, Type: Spontaneous , Apgar1: None, Apgar5: None, Living: Fetal Demise, Birth Comments: None    # 4 - Date: , Sex: None, Weight: None, GA: None, Type: Spontaneous , Apgar1: None, Apgar5: None, Living: Fetal Demise, Birth Comments: None      Past Medical History:   Diagnosis Date    Anemia     Anxiety     Depression     Dyspareunia     Hematuria     HTN (hypertension)     Kidney stone     Migraine headache     UTI (urinary tract infection)      Current Medications[1]    Review of patient's allergies indicates:   Allergen Reactions    Sulfamethoxazole-trimethoprim Other (See Comments)       Past Surgical History:   Procedure Laterality Date    CERVICAL BIOPSY  W/ LOOP ELECTRODE EXCISION      Benign ([facility])    CYSTOSCOPY W/ URETERAL STENT PLACEMENT Left 2024    Procedure: CYSTOSCOPY, WITH URETERAL STENT INSERTION;  Surgeon: Lauri Nicole MD;  Location: [facility];  Service: Urology;  Laterality: Left;    LITHOTRIPSY      SINUS SURGERY      SINUS SURGERY      TONSILLECTOMY, ADENOIDECTOMY      TYMPANOSTOMY TUBE PLACEMENT       "URETEROSCOPY Left 04/30/2024    Procedure: URETEROSCOPY;  Surgeon: Lauri Nicole MD;  Location: [facility];  Service: Urology;  Laterality: Left;     Family History   Problem Relation Name Age of Onset    Heart disease Mother Sagar Horan     Hypertension Mother Sagar Horan     Breast cancer Neg Hx      Colon cancer Neg Hx      Ovarian cancer Neg Hx      Uterine cancer Neg Hx       Social History[2]    Physical Exam:  /84   Temp 97.7 °F (36.5 °C)   Ht 5' 5" (1.651 m)   Wt 62.3 kg (137 lb 6.4 oz)   LMP 04/20/2025 (Approximate)   BMI 22.86 kg/m²     Chaperone: present.       General appearance: healthy, well-nourished and well-developed     Psychiatric: Orientation to time, place and person. Normal mood and affect and active, alert     Skin: Appearance: no rashes or lesions.     Neck:   Neck: supple, FROM, trachea midline. and no masses   Thyroid: no enlargement or nodules and non-tender.       Cardiovascular:   Auscultation: RRR and no murmur.   Peripheral Vascular: no varicosities, LLE edema, RLE edema, calf tenderness, and palpable cord and pedal pulses intact.     Lungs:   Respiratory effort: no intercostal retractions or accessory muscle usage.   Auscultation: no wheezing, rales/crackles, or rhonchi and clear to auscultation.     Breast:   Inspection/Palpation: no tenderness, discrete/distinct masses, skin changes, or abnormal secretions. Nipple appearance normal.     Abdomen:   Auscultation/Inspection/Palpation: no hepatomegaly, splenomegaly, masses, tenderness or CVA tenderness and soft, non-distended bowel sounds preset.    Hernia: no palpable hernias.     Female Genitalia:    Vulva: no masses, tenderness or lesions    Bladder/Urethra: no urethral discharge or mass, normal meatus, bladder non-distended.    Vagina: no tenderness, erythema, cystocele, rectocele, abnormal vaginal discharge or vesicle(s) or ulcers    Cervix: no discharge, no cervical lacerations noted or motion tenderness and " grossly normal    Uterus: normal size and shape and midline, non-tender, and no uterine prolapse.    Adnexa/Parametria: no parametrial tenderness or mass, no adnexal tenderness or ovarian mass.     Lymph Nodes:   Palpation: non tender submandibular nodes, axillary nodes, or inguinal nodes.     Rectal Exam:   Rectum: normal perianal skin.       Assessment/Plan:  1. Well woman exam  Pap gc/cz/tv  Recommend BSE monthly   Discussed recommendations of annual screening after age of 40 with mammogram  Explained that screening is not 100% reliable. Advised patient if she notices any changes to her breast including a lump, mass, dimpling, discharge, rash, or tenderness she should contact us immediately.     Healthy diet, exercise   MMG  Multivitamin   Seat belt   Sunscreen use   Safe sex/ STI education  Contraception: nothing  Annual labs: w/ PCP  Gardasil: 0/3    2. Bilateral Adnexal masses   Educated  Reviewed CT imaging w/ pt  Cultures: gc/cz/tv/myco/urea  Pelvic US  RTC 2 weeks      CT abd/pel 4/10/25  IMPRESSION:  2. A 4.5 x 6 cm cystic structure is present in the right adnexa. A 2.5 x 3.7 cm cystic appearing structure is also noted in the left adnexa. Further evaluation with ultrasonography is recommended.     -     US Pelvis Comp with Transvag NON-OB (xpd; Future; Expected date: 05/07/2025    3. Menorrhagia with regular cycle  4. Dysmenorrhea  Educated    NSAIDs, heating pad, warm bath  Aleve gel caps  Pain/ER precautions   Cultures     -     MDL Sendout Test    5. Night sweat  Educated  Fans, layered clothing    6. Screening mammogram for breast cancer  -     Mammo Digital Screening Bilat w/ [place] (XPD); Future; Expected date: 04/30/2025          This note was transcribed by Zarina Ingram MA. There may be transcription errors as a result, however minimal. I agree with transcription and every effort has been made to ensure accuracy of transcription, but any obvious errors or omissions should be clarified with the author  of the document.               [1]   Current Outpatient Medications:     cephALEXin (KEFLEX) 500 MG capsule, Take 500 mg by mouth 3 (three) times daily., Disp: , Rfl:     FLUoxetine 40 MG capsule, TAKE 1 CAPSULE BY MOUTH EVERY DAY AT NIGHT, Disp: , Rfl:     olmesartan (BENICAR) 40 MG tablet, Take 40 mg by mouth once daily., Disp: , Rfl:     rizatriptan (MAXALT-MLT) 10 MG disintegrating tablet, Take 10 mg by mouth as needed for Migraine. May repeat in 2 hours if needed, Disp: , Rfl:   [2]   Social History  Socioeconomic History    Marital status:    Tobacco Use    Smoking status: Every Day     Current packs/day: 0.50     Average packs/day: 0.5 packs/day for 15.0 years (7.5 ttl pk-yrs)     Types: Cigarettes   Substance and Sexual Activity    Alcohol use: Not Currently    Drug use: Never    Sexual activity: Yes     Partners: Male     Birth control/protection: None

## 2025-04-30 ENCOUNTER — OFFICE VISIT (OUTPATIENT)
Dept: OBSTETRICS AND GYNECOLOGY | Facility: CLINIC | Age: 40
End: 2025-04-30
Payer: MEDICAID

## 2025-04-30 VITALS
HEIGHT: 65 IN | TEMPERATURE: 98 F | BODY MASS INDEX: 22.89 KG/M2 | SYSTOLIC BLOOD PRESSURE: 122 MMHG | DIASTOLIC BLOOD PRESSURE: 84 MMHG | WEIGHT: 137.38 LBS

## 2025-04-30 DIAGNOSIS — Z12.31 SCREENING MAMMOGRAM FOR BREAST CANCER: ICD-10-CM

## 2025-04-30 DIAGNOSIS — N92.0 MENORRHAGIA WITH REGULAR CYCLE: ICD-10-CM

## 2025-04-30 DIAGNOSIS — Z01.419 WELL WOMAN EXAM: Primary | ICD-10-CM

## 2025-04-30 DIAGNOSIS — N94.9 ADNEXAL CYST: ICD-10-CM

## 2025-04-30 DIAGNOSIS — R61 NIGHT SWEAT: ICD-10-CM

## 2025-04-30 DIAGNOSIS — N94.6 DYSMENORRHEA: ICD-10-CM

## 2025-04-30 DIAGNOSIS — Z12.4 CERVICAL CANCER SCREENING: ICD-10-CM

## 2025-04-30 RX ORDER — CEPHALEXIN 500 MG/1
500 CAPSULE ORAL 3 TIMES DAILY
COMMUNITY
Start: 2025-04-16

## 2025-04-30 RX ORDER — OLMESARTAN MEDOXOMIL 40 MG/1
40 TABLET ORAL DAILY
COMMUNITY
Start: 2025-04-16

## 2025-05-07 ENCOUNTER — HOSPITAL ENCOUNTER (OUTPATIENT)
Dept: RADIOLOGY | Facility: HOSPITAL | Age: 40
Discharge: HOME | End: 2025-05-07
Attending: OBSTETRICS & GYNECOLOGY
Payer: MEDICAID

## 2025-05-07 DIAGNOSIS — Z12.31 SCREENING MAMMOGRAM FOR BREAST CANCER: ICD-10-CM

## 2025-05-07 DIAGNOSIS — N94.9 ADNEXAL CYST: ICD-10-CM

## 2025-05-07 DIAGNOSIS — N92.0 MENORRHAGIA WITH REGULAR CYCLE: ICD-10-CM

## 2025-05-07 PROCEDURE — 77067 SCR MAMMO BI INCL CAD: CPT | Mod: TC

## 2025-05-07 PROCEDURE — 76830 TRANSVAGINAL US NON-OB: CPT | Mod: TC

## 2025-05-15 ENCOUNTER — RESULTS FOLLOW-UP (OUTPATIENT)
Dept: OBSTETRICS AND GYNECOLOGY | Facility: CLINIC | Age: 40
End: 2025-05-15

## 2025-05-15 DIAGNOSIS — R92.8 ABNORMAL MAMMOGRAM OF LEFT BREAST: Primary | ICD-10-CM

## 2025-05-15 NOTE — PROGRESS NOTES
Phoned patient, informed of results per Dr Coto. Ordered dx MMG and L breast US. Informed to call office with Mmg date so fu appt can be scheduled with Dr Coto. Patient verbalized understanding and agrees with plan of care.

## 2025-05-22 ENCOUNTER — OFFICE VISIT (OUTPATIENT)
Dept: OBSTETRICS AND GYNECOLOGY | Facility: CLINIC | Age: 40
End: 2025-05-22
Payer: MEDICAID

## 2025-05-22 VITALS
SYSTOLIC BLOOD PRESSURE: 118 MMHG | HEIGHT: 65 IN | WEIGHT: 137 LBS | DIASTOLIC BLOOD PRESSURE: 76 MMHG | BODY MASS INDEX: 22.82 KG/M2

## 2025-05-22 DIAGNOSIS — N92.0 MENORRHAGIA WITH REGULAR CYCLE: ICD-10-CM

## 2025-05-22 DIAGNOSIS — N94.6 DYSMENORRHEA: ICD-10-CM

## 2025-05-22 DIAGNOSIS — Z72.0 TOBACCO USE: ICD-10-CM

## 2025-05-22 DIAGNOSIS — N94.89 ADNEXAL MASS: Primary | ICD-10-CM

## 2025-05-22 DIAGNOSIS — R92.8 ABNORMAL MAMMOGRAM OF LEFT BREAST: ICD-10-CM

## 2025-05-22 PROCEDURE — 99214 OFFICE O/P EST MOD 30 MIN: CPT | Mod: ,,, | Performed by: OBSTETRICS & GYNECOLOGY

## 2025-05-22 PROCEDURE — 3074F SYST BP LT 130 MM HG: CPT | Mod: CPTII,,, | Performed by: OBSTETRICS & GYNECOLOGY

## 2025-05-22 PROCEDURE — 3008F BODY MASS INDEX DOCD: CPT | Mod: CPTII,,, | Performed by: OBSTETRICS & GYNECOLOGY

## 2025-05-22 PROCEDURE — 3078F DIAST BP <80 MM HG: CPT | Mod: CPTII,,, | Performed by: OBSTETRICS & GYNECOLOGY

## 2025-05-22 PROCEDURE — 1159F MED LIST DOCD IN RCRD: CPT | Mod: CPTII,,, | Performed by: OBSTETRICS & GYNECOLOGY

## 2025-05-22 PROCEDURE — 4010F ACE/ARB THERAPY RXD/TAKEN: CPT | Mod: CPTII,,, | Performed by: OBSTETRICS & GYNECOLOGY

## 2025-05-22 NOTE — PROGRESS NOTES
Chief Complaint:  Follow-up    History of Present Illness:  Patient is a 39 y.o.  presents to follow up pelvic ultrasound secondary to adnexal mass and menorrhagia/dysmenorrhea. Patient's last menstrual period was 2025 (approximate). Monthly cycles lasting 5 days with heavy bleeding, painful.         Gyn History:  Menstrual History  Cycle: Yes  Menarche Age: 12 years  Flow Duration: 5  Flow: (!) Heavy  Interval: 28  Intermenstrual Bleeding: No  Dysmenorrhea: Yes  Dysmenorrhea Severity : (!) Severe    Menopause  Menopause Age: 1 years    Pap History  Last pap date: 25 (NIL -HPV -GC CZ TV)  Result: Normal  History of Abnormal Pap: (!) Yes  Treatment [facility]  HPV Vaccine Completed: No (0/3)    [place]  Sexually Active: Yes  Sexual Orientation: heterosexual  Postcoital Bleeding: No  Dyspareunia: Yes  STI History: No  Contraception: No    Breast History  Last Breast Imaging Date: Yes (possible asymmetry in the left breast needs further evaluation (indeterminate))  Date: 25  History of Abnormal Breast Imaging : (!) Yes  History of Breast Biopsy: No      Review of systems:  General/Constitutional: Chills denies. Fatigue/weakness denies. Fever denies. Night sweats denies. Hot flashes denies  Breast: Dimpling denies. Breast mass denies. Breast pain/tenderness denies. Nipple discharge denies. Puckering denies.  Gastrointestinal: Abdominal pain denies. Blood in stool denies. Constipation denies. Diarrhea denies. Heartburn denies. Nausea denies. Vomiting denies   Genitourinary: Incontinence denies. Blood in urine denies. Frequent urination denies. Urgency denies. Painful urination denies. Nocturia denies   Gynecologic: Irregular menses denies. Heavy bleeding denies. Painful menses denies. Vaginal discharge denies. Vaginal odor denies. Vaginal itching/Irritation denies. Vaginal lesion denies.  Pelvic pain denies. Decreased libido denies. Vulvar lesion denies.  "Prolapse of genital organs denies. Painful intercourse denies. Postcoital bleeding denies   Psychiatric: Mood lability denies. Depressed mood denies. Suicidal thoughts denies. Anxiety denies. Overwhelmed denies. Appetite normal. Energy level normal.     OB History    Para Term  AB Living   4 2 2 0 2 2   SAB IAB Ectopic Multiple Live Births   2 0 0 0 2      # 1 - Date: 03, Sex: Female, Weight: 3.345 kg (7 lb 6 oz), GA: None, Type: Vaginal, Spontaneous, Apgar1: None, Apgar5: None, Living: Living, Birth Comments: None    # 2 - Date: 16, Sex: Male, Weight: 3.204 kg (7 lb 1 oz), GA: 37w0d, Type: Vaginal, Spontaneous, Apgar1: None, Apgar5: None, Living: Living, Birth Comments: None    # 3 - Date: , Sex: None, Weight: None, GA: 6w0d, Type: Spontaneous , Apgar1: None, Apgar5: None, Living: Fetal Demise, Birth Comments: None    # 4 - Date: , Sex: None, Weight: None, GA: None, Type: Spontaneous , Apgar1: None, Apgar5: None, Living: Fetal Demise, Birth Comments: None      Past Medical History:   Diagnosis Date    Anemia     Anxiety     Depression     Dyspareunia     Hematuria     HTN (hypertension)     Kidney stone     Migraine headache     UTI (urinary tract infection)      Current Medications[1]      Physical Exam:  /76 (BP Location: Right arm, Patient Position: Sitting)   Ht 5' 5" (1.651 m)   Wt 62.1 kg (137 lb)   LMP 2025 (Approximate)   BMI 22.80 kg/m²     Constitutional: General appearance: healthy, well-nourished and well-developed   Psychiatric:  Orientation to time, place and person. Normal mood and affect and active, alert       Assessment/Plan:  1. Adnexal mass  Educated  Reviewed imaging and pap/cx with patient, see below  Bleeding/pain /ER precautions  Repeat pelvic US in 3 mos      IMAGING  US pelvis 25  - Uterus: 7.6 x 6.2 x 5.6 cm   - EMS: 0.95 cm   - ROV: 6.0 cm; a 3.1 cm minimally heterogeneous/complex hypoechoic cyst   - LOV: 3.3 cm; " subcentimeter mildly heterogeneous/complex cystic focus   - Free fluid: a small amount of free fluid in the bilateral adnexa and posterior cul-de-sac     CT abd/pel 4/10/25  IMPRESSION:  2. A 4.5 x 6 cm cystic structure is present in the right adnexa. A 2.5 x 3.7 cm cystic appearing structure is also noted in the left adnexa. Further evaluation with ultrasonography is recommended.        2. Menorrhagia with regular cycle, Dysmenorrhea  Educated    NSAIDs, heating pad, warm bath  Aleve gel caps  Pain/ER precautions     Pap 4/30/25 NIL neg HPV neg gc/cz/tv/myco/urea      Discussed with patient conservative vs medical management in form of progesterone only contraceptive management vs anti-inflammatory treatment    Pt desire anti-inflammatory    3. Abnormal mammogram  Advise to keep diagnostic breast imaging 6/2/25    Discussed recommendations of annual screening after age of 40 with mammogram and MRI for patients with lifetime risk greater than 25%       Explained that screening is not 100% reliable. Advised patient if she notices any changes to her breast including a lump, mass, dimpling, discharge, rash, or tenderness she should contact us immediately.       Recommend BSE monthly     Breast Cancer-related family history is negative for Breast cancer.      MMG 5/14/25  FINDINGS: There is a possible asymmetry in the left breast posterior depth lateral region seen on the craniocaudal view only.       No other significant masses, calcifications, or other findings are seen in either breast.       Recommend further evaluation of the left breast to include spot compression and rolled craniocaudal views as well as possible targeted left breast ultrasound.      IMPRESSION: INCOMPLETE: NEEDS ADDITIONAL IMAGING EVALUATION   1. The possible asymmetry in the left breast needs further evaluation.     2. No mammographic evidence of malignancy in the right breast.     RECOMMENDATIONS:    Recommend 3D left diagnostic mammogram and  possible targeted left breast ultrasound.         4. Tobacco use  Discussed harmful effects of tobacco and addictive characteristics     Advised cessation of use     Discussed with patient Chantix, bupropion, patch, or gum   Patient states understanding       Future Appointments   Date Time Provider Department Center   6/2/2025 10:00 AM [facility] MAMMO1 [facility] [place]   6/2/2025 10:30 AM [facility] US4  BREAST [facility] US [place]   6/19/2025  8:20 AM Sofia Coto MD [facility] [place] [facility]         This note was transcribed by Zarina Ingram MA. There may be transcription errors as a result, however minimal. I agree with transcription and every effort has been made to ensure accuracy of transcription, but any obvious errors or omissions should be clarified with the author of the document.               [1]   Current Outpatient Medications:     FLUoxetine 40 MG capsule, TAKE 1 CAPSULE BY MOUTH EVERY DAY AT NIGHT, Disp: , Rfl:     olmesartan (BENICAR) 40 MG tablet, Take 40 mg by mouth once daily., Disp: , Rfl:     rizatriptan (MAXALT-MLT) 10 MG disintegrating tablet, Take 10 mg by mouth as needed for Migraine. May repeat in 2 hours if needed, Disp: , Rfl:

## 2025-06-02 ENCOUNTER — HOSPITAL ENCOUNTER (OUTPATIENT)
Dept: RADIOLOGY | Facility: HOSPITAL | Age: 40
Discharge: HOME | End: 2025-06-02
Attending: OBSTETRICS & GYNECOLOGY
Payer: MEDICAID

## 2025-06-02 VITALS — HEIGHT: 65 IN | BODY MASS INDEX: 22.49 KG/M2 | WEIGHT: 135 LBS

## 2025-06-02 DIAGNOSIS — R92.8 ABNORMAL MAMMOGRAM OF LEFT BREAST: ICD-10-CM

## 2025-06-02 PROCEDURE — 77065 DX MAMMO INCL CAD UNI: CPT | Mod: 26,LT,, | Performed by: STUDENT IN AN ORGANIZED HEALTH CARE EDUCATION/TRAINING PROGRAM

## 2025-06-02 PROCEDURE — 77061 BREAST TOMOSYNTHESIS UNI: CPT | Mod: 26,LT,, | Performed by: STUDENT IN AN ORGANIZED HEALTH CARE EDUCATION/TRAINING PROGRAM

## 2025-06-02 PROCEDURE — 77065 DX MAMMO INCL CAD UNI: CPT | Mod: TC,LT

## 2025-06-18 ENCOUNTER — TELEPHONE (OUTPATIENT)
Dept: INTERNAL MEDICINE | Facility: CLINIC | Age: 40
End: 2025-06-18
Payer: MEDICAID

## 2025-06-18 NOTE — TELEPHONE ENCOUNTER
Per Dr. Coto: pt still needs to discuss imaging with Dr. Coto and plan of care. Also pt needs to call [place] [facility] to schedule pelvic ultrasound to be done in 3 months.

## 2025-06-18 NOTE — TELEPHONE ENCOUNTER
Patient has a scheduled appointment tomorrow.  The patient wanting to know if she still needs to attend the appointment?  She was given results already.  Please reach out to the patient as this message was sent to the wrong Dr. Coto's office

## 2025-06-18 NOTE — TELEPHONE ENCOUNTER
----- Message from Yun sent at 6/18/2025 10:40 AM CDT -----  Regarding: Call Back  Type:  Patient Returning Call    Who Called:  Who Left Message for Patient:  Does the patient know what this is regarding?:  Would the patient rather a call back or a response via [facility]?   Best Call Back Number:[phone]  Additional Information: Has F/u tomorrow but stated she was given results already when imaging was done. Also states she needed imaging done in July for Cyst?

## 2025-06-18 NOTE — TELEPHONE ENCOUNTER
----- Message from Yun sent at 6/18/2025 10:40 AM CDT -----  Regarding: Call Back  Type:  Patient Returning Call    Who Called:  Who Left Message for Patient:  Does the patient know what this is regarding?:  Would the patient rather a call back or a response via [facility]?   Best Call Back Number:[phone]  Additional Information: Has F/u tomorrow but stated she was given results already when imaging was done. Also states she needed imaging done in July for Cyst?

## 2025-06-23 NOTE — TELEPHONE ENCOUNTER
Phoned patient educated need to schedule an appointment for F/U breast imaging and 3 month f/u after pelvic U/S. Provided phone number to Radiology dept and transferred call to r/s f/u breast imaging.

## 2025-07-02 ENCOUNTER — OFFICE VISIT (OUTPATIENT)
Dept: OBSTETRICS AND GYNECOLOGY | Facility: CLINIC | Age: 40
End: 2025-07-02
Payer: MEDICAID

## 2025-07-02 VITALS
BODY MASS INDEX: 23.39 KG/M2 | DIASTOLIC BLOOD PRESSURE: 74 MMHG | WEIGHT: 140.38 LBS | SYSTOLIC BLOOD PRESSURE: 124 MMHG | HEIGHT: 65 IN

## 2025-07-02 DIAGNOSIS — R92.8 ABNORMAL MAMMOGRAM OF LEFT BREAST: Primary | ICD-10-CM

## 2025-07-02 PROCEDURE — 3008F BODY MASS INDEX DOCD: CPT | Mod: CPTII,,, | Performed by: OBSTETRICS & GYNECOLOGY

## 2025-07-02 PROCEDURE — 1159F MED LIST DOCD IN RCRD: CPT | Mod: CPTII,,, | Performed by: OBSTETRICS & GYNECOLOGY

## 2025-07-02 PROCEDURE — 3078F DIAST BP <80 MM HG: CPT | Mod: CPTII,,, | Performed by: OBSTETRICS & GYNECOLOGY

## 2025-07-02 PROCEDURE — 99213 OFFICE O/P EST LOW 20 MIN: CPT | Mod: ,,, | Performed by: OBSTETRICS & GYNECOLOGY

## 2025-07-02 PROCEDURE — 4010F ACE/ARB THERAPY RXD/TAKEN: CPT | Mod: CPTII,,, | Performed by: OBSTETRICS & GYNECOLOGY

## 2025-07-02 PROCEDURE — 3074F SYST BP LT 130 MM HG: CPT | Mod: CPTII,,, | Performed by: OBSTETRICS & GYNECOLOGY

## 2025-07-02 RX ORDER — ROSUVASTATIN CALCIUM 5 MG/1
5 TABLET, COATED ORAL DAILY
COMMUNITY

## 2025-07-02 RX ORDER — PANTOPRAZOLE SODIUM 40 MG/1
40 TABLET, DELAYED RELEASE ORAL DAILY
COMMUNITY

## 2025-07-02 NOTE — PROGRESS NOTES
Chief Complaint:  F/U Breast Imaging     History of Present Illness:  Patient is a 39 y.o.  presents to follow up diagnostic breast imaging secondary to an abnormal mammogram. Denies breast complaints.         Gyn History:  Menstrual History  Cycle: Yes  Menarche Age: 12 years  Flow Duration: 5  Flow: (!) Heavy  Interval: 28  Intermenstrual Bleeding: No  Dysmenorrhea: Yes  Dysmenorrhea Severity : (!) Severe    Menopause  Menopause Age: 1 years    Pap History  Last pap date: 25 (NIl neg HPV, GC/CZ/TV)  Result: Normal  History of Abnormal Pap: (!) Yes  Treatment used: LEEP ( [facility])  HPV Vaccine Completed: No (0/3)    [place]  Sexually Active: Yes  Sexual Orientation: heterosexual  Postcoital Bleeding: No  Dyspareunia: Yes  STI History: No  Contraception: No    Breast History  Last Breast Imaging Date: Yes ((possible asymmetry in the left breast needs further evaluation (indeterminate))  Date: 25  History of Abnormal Breast Imaging : (!) Yes  History of Breast Biopsy: No      Review of systems:  General/Constitutional: Chills denies. Fatigue/weakness denies. Fever denies. Night sweats denies. Hot flashes denies  Breast: Dimpling denies. Breast mass denies. Breast pain/tenderness denies. Nipple discharge denies. Puckering denies.  Gastrointestinal: Abdominal pain denies. Blood in stool denies. Constipation denies. Diarrhea denies. Heartburn denies. Nausea denies. Vomiting denies   Genitourinary: Incontinence denies. Blood in urine denies. Frequent urination denies. Urgency denies. Painful urination denies. Nocturia denies   Gynecologic: Irregular menses denies. Heavy bleeding denies. Painful menses denies. Vaginal discharge denies. Vaginal odor denies. Vaginal itching/Irritation denies. Vaginal lesion denies.  Pelvic pain denies. Decreased libido denies. Vulvar lesion denies. Prolapse of genital organs denies. Painful intercourse denies. Postcoital bleeding denies   Psychiatric:  "Mood lability denies. Depressed mood denies. Suicidal thoughts denies. Anxiety denies. Overwhelmed denies. Appetite normal. Energy level normal.     OB History    Para Term  AB Living   4 2 2 0 2 2   SAB IAB Ectopic Multiple Live Births   2 0 0 0 2      # 1 - Date: 03, Sex: Female, Weight: 3.345 kg (7 lb 6 oz), GA: None, Type: Vaginal, Spontaneous, Apgar1: None, Apgar5: None, Living: Living, Birth Comments: None    # 2 - Date: 16, Sex: Male, Weight: 3.204 kg (7 lb 1 oz), GA: 37w0d, Type: Vaginal, Spontaneous, Apgar1: None, Apgar5: None, Living: Living, Birth Comments: None    # 3 - Date: , Sex: None, Weight: None, GA: 6w0d, Type: Spontaneous , Apgar1: None, Apgar5: None, Living: Fetal Demise, Birth Comments: None    # 4 - Date: , Sex: None, Weight: None, GA: None, Type: Spontaneous , Apgar1: None, Apgar5: None, Living: Fetal Demise, Birth Comments: None      Past Medical History:   Diagnosis Date    Anemia     Anxiety     Depression     Dyspareunia     Hematuria     HTN (hypertension)     Kidney stone     Migraine headache     UTI (urinary tract infection)      Current Medications[1]      Physical Exam:  /74   Ht 5' 5" (1.651 m)   Wt 63.7 kg (140 lb 6.4 oz)   LMP 2025   BMI 23.36 kg/m²     Constitutional: General appearance: healthy, well-nourished and well-developed   Psychiatric:  Orientation to time, place and person. Normal mood and affect and active, alert       Assessment/Plan:  1. Abnormal mammogram of left breast  Educated   Reviewed recent MMG 25 with patient  Advised to continue at home breast exams    Discussed recommendations of annual screening after age of 40 with mammogram and MRI for patients with lifetime risk greater than 25%        Explained that screening is not 100% reliable. Advised patient if she notices any changes to her breast including a lump, mass, dimpling, discharge, rash, or tenderness she should contact us " immediately.        Recommend BSE monthly      Breast Cancer-related family history is negative for Breast cancer.     MM 06/02/2025:  FINDINGS:   Recently described possible asymmetry of the lateral left breast posteriorly effaces on additional views, compatible with benign fibroglandular tissue.  No persistent suspicious finding in the visualized left breast.          IMPRESSION: BENIGN  There is no mammographic evidence of malignancy.      RECOMMENDATIONS:   A routine screening mammogram in one year in the absence of significant clinical findings in the interval is recommended (June 2026).            MM 5/14/25  FINDINGS: There is a possible asymmetry in the left breast posterior depth lateral region seen on the craniocaudal view only.    No other significant masses, calcifications, or other findings are seen in either breast.       Recommend further evaluation of the left breast to include spot compression and rolled craniocaudal views as well as possible targeted left breast ultrasound.      IMPRESSION: INCOMPLETE: NEEDS ADDITIONAL IMAGING EVALUATION   1. The possible asymmetry in the left breast needs further evaluation.     2. No mammographic evidence of malignancy in the right breast.     RECOMMENDATIONS:    Recommend 3D left diagnostic mammogram and possible targeted left breast ultrasound.       2. Adnexal mass  Educated  Reviewed imaging and pap/cx with patient, see below  Bleeding/pain /ER precautions  Repeat pelvic US in 3 mos        IMAGING  US pelvis 5/7/25  - Uterus: 7.6 x 6.2 x 5.6 cm   - EMS: 0.95 cm   - ROV: 6.0 cm; a 3.1 cm minimally heterogeneous/complex hypoechoic cyst   - LOV: 3.3 cm; subcentimeter mildly heterogeneous/complex cystic focus   - Free fluid: a small amount of free fluid in the bilateral adnexa and posterior cul-de-sac      CT abd/pel 4/10/25  IMPRESSION:  2. A 4.5 x 6 cm cystic structure is present in the right adnexa. A 2.5 x 3.7 cm cystic appearing structure is also noted in  the left adnexa. Further evaluation with ultrasonography is recommended.     No future appointments.      This note was transcribed by Lorena Arroyo. There may be transcription errors as a result, however minimal. Effort has been made to ensure accuracy of transcription, but any obvious errors or omissions should be clarified with the author of the document.            [1]   Current Outpatient Medications:     FLUoxetine 40 MG capsule, TAKE 1 CAPSULE BY MOUTH EVERY DAY AT NIGHT, Disp: , Rfl:     olmesartan (BENICAR) 40 MG tablet, Take 40 mg by mouth once daily., Disp: , Rfl:     pantoprazole (PROTONIX) 40 MG tablet, Take 40 mg by mouth once daily., Disp: , Rfl:     rizatriptan (MAXALT-MLT) 10 MG disintegrating tablet, Take 10 mg by mouth as needed for Migraine. May repeat in 2 hours if needed, Disp: , Rfl:     rosuvastatin (CRESTOR) 5 MG tablet, Take 5 mg by mouth once daily. (Patient not taking: Reported on 7/2/2025), Disp: , Rfl:

## 2025-07-09 ENCOUNTER — TELEPHONE (OUTPATIENT)
Dept: OBSTETRICS AND GYNECOLOGY | Facility: CLINIC | Age: 40
End: 2025-07-09
Payer: MEDICAID

## 2025-07-09 NOTE — TELEPHONE ENCOUNTER
Called Steffanie ([phone]) in radiology at [facility] and got patient scheduled for her repeat TVUS for adnexal mass on 8/11/25 @ 10am.     I attempted to contact the patient and emergency contact regarding this information and to get her scheduled a f/u to review this imaging and breast imaging from 6/2/25 but they didn't answer and unable to leave a voice mail.     Will send portal message.

## 2025-07-10 NOTE — TELEPHONE ENCOUNTER
Attempted to contact the patient but unable to get in touch and the emergency contact number is wrong.     Will send another portal message.

## 2025-08-11 ENCOUNTER — HOSPITAL ENCOUNTER (OUTPATIENT)
Dept: RADIOLOGY | Facility: HOSPITAL | Age: 40
Discharge: HOME | End: 2025-08-11
Attending: OBSTETRICS & GYNECOLOGY
Payer: MEDICAID

## 2025-08-11 DIAGNOSIS — N94.89 ADNEXAL MASS: ICD-10-CM

## 2025-08-11 PROCEDURE — 76830 TRANSVAGINAL US NON-OB: CPT | Mod: TC

## 2025-08-15 ENCOUNTER — OFFICE VISIT (OUTPATIENT)
Dept: OBSTETRICS AND GYNECOLOGY | Facility: CLINIC | Age: 40
End: 2025-08-15
Payer: MEDICAID

## 2025-08-15 VITALS
WEIGHT: 135.38 LBS | DIASTOLIC BLOOD PRESSURE: 88 MMHG | BODY MASS INDEX: 22.56 KG/M2 | HEIGHT: 65 IN | SYSTOLIC BLOOD PRESSURE: 138 MMHG

## 2025-08-15 DIAGNOSIS — N92.6 MENSES, IRREGULAR: ICD-10-CM

## 2025-08-15 DIAGNOSIS — N94.10 DYSPAREUNIA IN FEMALE: ICD-10-CM

## 2025-08-15 DIAGNOSIS — N94.6 DYSMENORRHEA: ICD-10-CM

## 2025-08-15 DIAGNOSIS — Z72.0 TOBACCO USE: ICD-10-CM

## 2025-08-15 DIAGNOSIS — N83.201 BILATERAL OVARIAN CYSTS: ICD-10-CM

## 2025-08-15 DIAGNOSIS — N83.202 BILATERAL OVARIAN CYSTS: ICD-10-CM

## 2025-08-15 DIAGNOSIS — I10 HYPERTENSION, UNSPECIFIED TYPE: Primary | ICD-10-CM

## 2025-08-15 LAB
B-HCG UR QL: NEGATIVE
CTP QC/QA: YES

## 2025-08-15 RX ORDER — DROSPIRENONE 4 MG/1
4 TABLET, FILM COATED ORAL DAILY
Qty: 28 TABLET | Refills: 2 | Status: SHIPPED | OUTPATIENT
Start: 2025-08-15

## (undated) DEVICE — VALVE OLYMPUS SCOPE SUCTION

## (undated) DEVICE — SOL IRR SOD CHL .9% POUR

## (undated) DEVICE — TRAY SKIN SCRUB WET PREMIUM

## (undated) DEVICE — BOWL STERILE LARGE 32OZ

## (undated) DEVICE — MARKER WRITESITE SKIN CHLRAPRP

## (undated) DEVICE — GLOVE PROTEXIS HYDROGEL SZ7.5

## (undated) DEVICE — Device

## (undated) DEVICE — ADAPTER STOPCOCK FEMALE LL

## (undated) DEVICE — SOL NACL IRR 1000ML BTL

## (undated) DEVICE — KIT CLN RIVITAL-OX ENDOSCP

## (undated) DEVICE — URETEROSCOPE FLX 1.2X650X3.1MM

## (undated) DEVICE — SYR 30CC LUER LOCK

## (undated) DEVICE — CYSTOGRAFIN CONTRAST MEDIA 30%

## (undated) DEVICE — POSITIONER HEAD ADULT

## (undated) DEVICE — SHEATH URETERAL FLEXOR 12X20CM

## (undated) DEVICE — TRAP MUCOUS SPECIMEN 80CCBY BU

## (undated) DEVICE — SOLIDIFIER BOTTLE 1500CC

## (undated) DEVICE — TUBING SUC MEDI-VAC CONN 6FT

## (undated) DEVICE — SHEATH FLEXOR ACCESS 12X35

## (undated) DEVICE — KIT SURGICAL TURNOVER

## (undated) DEVICE — CONNECTOR TUBING OXYGEN

## (undated) DEVICE — TUBING AIRLIFE O2 VINYL 7FT

## (undated) DEVICE — APPLICATOR STRL COT 2INNR 6IN

## (undated) DEVICE — SET LEADWIRE PINCH 3 LD 50IN

## (undated) DEVICE — TIP SUCTION YANKAUER

## (undated) DEVICE — ATOMIZER NASAL DRUG DEL NO SYR

## (undated) DEVICE — BASKET STONE RETRV 1.9FRX120CM

## (undated) DEVICE — NEBULIZER INTIN UP-DRAFT II NE

## (undated) DEVICE — SYR ONLY LEUR TIP 30CC

## (undated) DEVICE — WRAP STERILIZATION 45X45 DISP

## (undated) DEVICE — BAG DRAIN UROLOGY AND HOSE

## (undated) DEVICE — VALVE OLYMPUS SCOPE BIOPSY

## (undated) DEVICE — SYR SLIP TIP 10ML SHIELD

## (undated) DEVICE — SOL NACL IRR 3000ML

## (undated) DEVICE — GUIDEWIRE STF .035X180CM ANG

## (undated) DEVICE — CATH POLLACK OPEN-END FLEXI-TI

## (undated) DEVICE — SPONGE COTTON TRAY 4X4IN

## (undated) DEVICE — KIT CANIST SUCTION 1200CC

## (undated) DEVICE — SUPPORT ULNA NERVE PROTECTOR

## (undated) DEVICE — GOWN SURGICAL XX LARGE X LONG

## (undated) DEVICE — SENSOR DUAL FLEX STR 150CM

## (undated) DEVICE — ELECTRODE RED DOT EKG

## (undated) DEVICE — ADAPTER NUT NIPPLE